# Patient Record
Sex: FEMALE | Race: WHITE | NOT HISPANIC OR LATINO
[De-identification: names, ages, dates, MRNs, and addresses within clinical notes are randomized per-mention and may not be internally consistent; named-entity substitution may affect disease eponyms.]

---

## 2017-06-22 ENCOUNTER — APPOINTMENT (OUTPATIENT)
Dept: CT IMAGING | Facility: CLINIC | Age: 77
End: 2017-06-22

## 2017-06-22 ENCOUNTER — OUTPATIENT (OUTPATIENT)
Dept: OUTPATIENT SERVICES | Facility: HOSPITAL | Age: 77
LOS: 1 days | End: 2017-06-22
Payer: MEDICARE

## 2017-06-22 DIAGNOSIS — Z90.49 ACQUIRED ABSENCE OF OTHER SPECIFIED PARTS OF DIGESTIVE TRACT: Chronic | ICD-10-CM

## 2017-06-22 DIAGNOSIS — Z00.8 ENCOUNTER FOR OTHER GENERAL EXAMINATION: ICD-10-CM

## 2017-06-22 DIAGNOSIS — Z98.89 OTHER SPECIFIED POSTPROCEDURAL STATES: Chronic | ICD-10-CM

## 2017-06-22 PROCEDURE — 71250 CT THORAX DX C-: CPT

## 2017-06-28 ENCOUNTER — RESULT REVIEW (OUTPATIENT)
Age: 77
End: 2017-06-28

## 2017-06-28 ENCOUNTER — INPATIENT (INPATIENT)
Facility: HOSPITAL | Age: 77
LOS: 4 days | Discharge: HOME | DRG: 167 | End: 2017-07-03
Attending: HOSPITALIST
Payer: MEDICARE

## 2017-06-28 VITALS
TEMPERATURE: 98 F | HEART RATE: 70 BPM | SYSTOLIC BLOOD PRESSURE: 105 MMHG | WEIGHT: 129.41 LBS | HEIGHT: 61 IN | OXYGEN SATURATION: 100 % | RESPIRATION RATE: 22 BRPM | DIASTOLIC BLOOD PRESSURE: 56 MMHG

## 2017-06-28 DIAGNOSIS — I95.89 OTHER HYPOTENSION: ICD-10-CM

## 2017-06-28 DIAGNOSIS — Z98.89 OTHER SPECIFIED POSTPROCEDURAL STATES: Chronic | ICD-10-CM

## 2017-06-28 DIAGNOSIS — J18.9 PNEUMONIA, UNSPECIFIED ORGANISM: ICD-10-CM

## 2017-06-28 DIAGNOSIS — Z90.49 ACQUIRED ABSENCE OF OTHER SPECIFIED PARTS OF DIGESTIVE TRACT: Chronic | ICD-10-CM

## 2017-06-28 DIAGNOSIS — R09.02 HYPOXEMIA: ICD-10-CM

## 2017-06-28 LAB
ALBUMIN SERPL ELPH-MCNC: 2.9 G/DL — LOW (ref 3.3–5)
ALP SERPL-CCNC: 78 U/L — SIGNIFICANT CHANGE UP (ref 40–120)
ALT FLD-CCNC: 23 U/L RC — SIGNIFICANT CHANGE UP (ref 10–45)
ANION GAP SERPL CALC-SCNC: 13 MMOL/L — SIGNIFICANT CHANGE UP (ref 5–17)
AST SERPL-CCNC: 21 U/L — SIGNIFICANT CHANGE UP (ref 10–40)
B PERT IGG+IGM PNL SER: ABNORMAL
BASOPHILS # BLD AUTO: 0 K/UL — SIGNIFICANT CHANGE UP (ref 0–0.2)
BILIRUB SERPL-MCNC: 0.3 MG/DL — SIGNIFICANT CHANGE UP (ref 0.2–1.2)
BUN SERPL-MCNC: 25 MG/DL — HIGH (ref 7–23)
CALCIUM SERPL-MCNC: 8.3 MG/DL — LOW (ref 8.4–10.5)
CHLORIDE SERPL-SCNC: 107 MMOL/L — SIGNIFICANT CHANGE UP (ref 96–108)
CK MB BLD-MCNC: 4 % — HIGH (ref 0–3.5)
CK MB CFR SERPL CALC: 1.4 NG/ML — SIGNIFICANT CHANGE UP (ref 0–3.8)
CK SERPL-CCNC: 35 U/L — SIGNIFICANT CHANGE UP (ref 25–170)
CO2 SERPL-SCNC: 19 MMOL/L — LOW (ref 22–31)
COLOR FLD: SIGNIFICANT CHANGE UP
CREAT SERPL-MCNC: 0.69 MG/DL — SIGNIFICANT CHANGE UP (ref 0.5–1.3)
EOSINOPHIL # BLD AUTO: 0.1 K/UL — SIGNIFICANT CHANGE UP (ref 0–0.5)
EOSINOPHIL # FLD: 6 % — SIGNIFICANT CHANGE UP
EOSINOPHIL NFR BLD AUTO: 1 % — SIGNIFICANT CHANGE UP (ref 0–6)
FLUID INTAKE SUBSTANCE CLASS: SIGNIFICANT CHANGE UP
FLUID SEGMENTED GRANULOCYTES: 59 % — SIGNIFICANT CHANGE UP
GAS PNL BLDA: SIGNIFICANT CHANGE UP
GLUCOSE SERPL-MCNC: 134 MG/DL — HIGH (ref 70–99)
GRAM STN FLD: SIGNIFICANT CHANGE UP
HCT VFR BLD CALC: 33.5 % — LOW (ref 34.5–45)
HGB BLD-MCNC: 11.3 G/DL — LOW (ref 11.5–15.5)
LYMPHOCYTES # BLD AUTO: 1.6 K/UL — SIGNIFICANT CHANGE UP (ref 1–3.3)
LYMPHOCYTES # BLD AUTO: 9 % — LOW (ref 13–44)
LYMPHOCYTES # FLD: 7 % — SIGNIFICANT CHANGE UP
MAGNESIUM SERPL-MCNC: 2 MG/DL — SIGNIFICANT CHANGE UP (ref 1.6–2.6)
MCHC RBC-ENTMCNC: 30.6 PG — SIGNIFICANT CHANGE UP (ref 27–34)
MCHC RBC-ENTMCNC: 33.7 GM/DL — SIGNIFICANT CHANGE UP (ref 32–36)
MCV RBC AUTO: 90.7 FL — SIGNIFICANT CHANGE UP (ref 80–100)
MESOTHL CELL # FLD: 2 % — SIGNIFICANT CHANGE UP
MONOCYTES # BLD AUTO: 0.3 K/UL — SIGNIFICANT CHANGE UP (ref 0–0.9)
MONOCYTES NFR BLD AUTO: 1 % — LOW (ref 2–14)
MONOS+MACROS # FLD: 26 % — SIGNIFICANT CHANGE UP
NEUTROPHILS # BLD AUTO: 20.3 K/UL — HIGH (ref 1.8–7.4)
NEUTROPHILS NFR BLD AUTO: 88 % — HIGH (ref 43–77)
NEUTS BAND # BLD: 1 % — SIGNIFICANT CHANGE UP (ref 0–8)
NIGHT BLUE STAIN TISS: SIGNIFICANT CHANGE UP
PHOSPHATE SERPL-MCNC: 2.9 MG/DL — SIGNIFICANT CHANGE UP (ref 2.5–4.5)
PLAT MORPH BLD: NORMAL — SIGNIFICANT CHANGE UP
PLATELET # BLD AUTO: 467 K/UL — HIGH (ref 150–400)
POTASSIUM SERPL-MCNC: 4.4 MMOL/L — SIGNIFICANT CHANGE UP (ref 3.5–5.3)
POTASSIUM SERPL-SCNC: 4.4 MMOL/L — SIGNIFICANT CHANGE UP (ref 3.5–5.3)
PROT SERPL-MCNC: 6.4 G/DL — SIGNIFICANT CHANGE UP (ref 6–8.3)
RBC # BLD: 3.7 M/UL — LOW (ref 3.8–5.2)
RBC # FLD: 13 % — SIGNIFICANT CHANGE UP (ref 10.3–14.5)
RBC BLD AUTO: SIGNIFICANT CHANGE UP
RCV VOL RI: HIGH /UL (ref 0–5)
SODIUM SERPL-SCNC: 139 MMOL/L — SIGNIFICANT CHANGE UP (ref 135–145)
SPECIMEN SOURCE: SIGNIFICANT CHANGE UP
SPECIMEN SOURCE: SIGNIFICANT CHANGE UP
TOTAL NUCLEATED CELL COUNT, BODY FLUID: 380 /UL — HIGH (ref 0–5)
TROPONIN T SERPL-MCNC: <0.01 NG/ML — SIGNIFICANT CHANGE UP (ref 0–0.06)
TUBE TYPE: SIGNIFICANT CHANGE UP
WBC # BLD: 22.4 K/UL — HIGH (ref 3.8–10.5)
WBC # FLD AUTO: 22.4 K/UL — HIGH (ref 3.8–10.5)

## 2017-06-28 PROCEDURE — 88305 TISSUE EXAM BY PATHOLOGIST: CPT | Mod: 26

## 2017-06-28 PROCEDURE — 88312 SPECIAL STAINS GROUP 1: CPT | Mod: 26

## 2017-06-28 PROCEDURE — 93010 ELECTROCARDIOGRAM REPORT: CPT

## 2017-06-28 PROCEDURE — 88188 FLOWCYTOMETRY/READ 9-15: CPT

## 2017-06-28 PROCEDURE — 71010: CPT | Mod: 26

## 2017-06-28 PROCEDURE — 71010: CPT | Mod: 26,77

## 2017-06-28 PROCEDURE — 88112 CYTOPATH CELL ENHANCE TECH: CPT | Mod: 26

## 2017-06-28 PROCEDURE — 99291 CRITICAL CARE FIRST HOUR: CPT

## 2017-06-28 RX ORDER — ATORVASTATIN CALCIUM 80 MG/1
1 TABLET, FILM COATED ORAL
Qty: 0 | Refills: 0 | COMMUNITY

## 2017-06-28 RX ORDER — VANCOMYCIN HCL 1 G
1000 VIAL (EA) INTRAVENOUS EVERY 12 HOURS
Qty: 0 | Refills: 0 | Status: DISCONTINUED | OUTPATIENT
Start: 2017-06-28 | End: 2017-06-28

## 2017-06-28 RX ORDER — FUROSEMIDE 40 MG
20 TABLET ORAL ONCE
Qty: 0 | Refills: 0 | Status: COMPLETED | OUTPATIENT
Start: 2017-06-28 | End: 2017-06-28

## 2017-06-28 RX ORDER — GABAPENTIN 400 MG/1
300 CAPSULE ORAL THREE TIMES A DAY
Qty: 0 | Refills: 0 | Status: DISCONTINUED | OUTPATIENT
Start: 2017-06-28 | End: 2017-07-03

## 2017-06-28 RX ORDER — GABAPENTIN 400 MG/1
1 CAPSULE ORAL
Qty: 0 | Refills: 0 | COMMUNITY

## 2017-06-28 RX ORDER — ASPIRIN/CALCIUM CARB/MAGNESIUM 324 MG
1 TABLET ORAL
Qty: 0 | Refills: 0 | COMMUNITY

## 2017-06-28 RX ORDER — HEPARIN SODIUM 5000 [USP'U]/ML
5000 INJECTION INTRAVENOUS; SUBCUTANEOUS EVERY 8 HOURS
Qty: 0 | Refills: 0 | Status: DISCONTINUED | OUTPATIENT
Start: 2017-06-28 | End: 2017-07-03

## 2017-06-28 RX ORDER — ASPIRIN/CALCIUM CARB/MAGNESIUM 324 MG
81 TABLET ORAL DAILY
Qty: 0 | Refills: 0 | Status: DISCONTINUED | OUTPATIENT
Start: 2017-06-28 | End: 2017-07-03

## 2017-06-28 RX ORDER — VANCOMYCIN HCL 1 G
1000 VIAL (EA) INTRAVENOUS EVERY 12 HOURS
Qty: 0 | Refills: 0 | Status: DISCONTINUED | OUTPATIENT
Start: 2017-06-28 | End: 2017-07-02

## 2017-06-28 RX ORDER — HEPARIN SODIUM 5000 [USP'U]/ML
5000 INJECTION INTRAVENOUS; SUBCUTANEOUS EVERY 8 HOURS
Qty: 0 | Refills: 0 | Status: DISCONTINUED | OUTPATIENT
Start: 2017-06-28 | End: 2017-06-28

## 2017-06-28 RX ORDER — PIPERACILLIN AND TAZOBACTAM 4; .5 G/20ML; G/20ML
3.38 INJECTION, POWDER, LYOPHILIZED, FOR SOLUTION INTRAVENOUS EVERY 8 HOURS
Qty: 0 | Refills: 0 | Status: DISCONTINUED | OUTPATIENT
Start: 2017-06-28 | End: 2017-06-28

## 2017-06-28 RX ORDER — LINEZOLID 600 MG/300ML
INJECTION, SOLUTION INTRAVENOUS
Qty: 0 | Refills: 0 | Status: DISCONTINUED | OUTPATIENT
Start: 2017-06-28 | End: 2017-06-28

## 2017-06-28 RX ORDER — PHENYLEPHRINE HYDROCHLORIDE 10 MG/ML
0.4 INJECTION INTRAVENOUS
Qty: 80 | Refills: 0 | Status: DISCONTINUED | OUTPATIENT
Start: 2017-06-28 | End: 2017-06-29

## 2017-06-28 RX ORDER — ATORVASTATIN CALCIUM 80 MG/1
40 TABLET, FILM COATED ORAL AT BEDTIME
Qty: 0 | Refills: 0 | Status: DISCONTINUED | OUTPATIENT
Start: 2017-06-28 | End: 2017-07-03

## 2017-06-28 RX ORDER — PIPERACILLIN AND TAZOBACTAM 4; .5 G/20ML; G/20ML
3.38 INJECTION, POWDER, LYOPHILIZED, FOR SOLUTION INTRAVENOUS ONCE
Qty: 0 | Refills: 0 | Status: COMPLETED | OUTPATIENT
Start: 2017-06-28 | End: 2017-06-28

## 2017-06-28 RX ORDER — PIPERACILLIN AND TAZOBACTAM 4; .5 G/20ML; G/20ML
3.38 INJECTION, POWDER, LYOPHILIZED, FOR SOLUTION INTRAVENOUS EVERY 8 HOURS
Qty: 0 | Refills: 0 | Status: DISCONTINUED | OUTPATIENT
Start: 2017-06-28 | End: 2017-07-03

## 2017-06-28 RX ADMIN — Medication 81 MILLIGRAM(S): at 14:25

## 2017-06-28 RX ADMIN — PIPERACILLIN AND TAZOBACTAM 25 GRAM(S): 4; .5 INJECTION, POWDER, LYOPHILIZED, FOR SOLUTION INTRAVENOUS at 21:38

## 2017-06-28 RX ADMIN — HEPARIN SODIUM 5000 UNIT(S): 5000 INJECTION INTRAVENOUS; SUBCUTANEOUS at 14:24

## 2017-06-28 RX ADMIN — Medication 20 MILLIGRAM(S): at 14:25

## 2017-06-28 RX ADMIN — Medication 250 MILLIGRAM(S): at 18:03

## 2017-06-28 RX ADMIN — HEPARIN SODIUM 5000 UNIT(S): 5000 INJECTION INTRAVENOUS; SUBCUTANEOUS at 21:37

## 2017-06-28 RX ADMIN — GABAPENTIN 300 MILLIGRAM(S): 400 CAPSULE ORAL at 21:37

## 2017-06-28 RX ADMIN — PIPERACILLIN AND TAZOBACTAM 200 GRAM(S): 4; .5 INJECTION, POWDER, LYOPHILIZED, FOR SOLUTION INTRAVENOUS at 14:25

## 2017-06-28 RX ADMIN — ATORVASTATIN CALCIUM 40 MILLIGRAM(S): 80 TABLET, FILM COATED ORAL at 21:37

## 2017-06-28 RX ADMIN — PHENYLEPHRINE HYDROCHLORIDE 8.8 MICROGRAM(S)/KG/MIN: 10 INJECTION INTRAVENOUS at 13:30

## 2017-06-28 NOTE — H&P ADULT - ATTENDING COMMENTS
Admitted to ICU for hypotension during and after bronchoscopy with biopsy - titrating clif off; check cardiac enzymes; obtain baseline ekg (twi on ekg currently)    Hypoxemia with exertion - suspect chronic eos pna given moving pattern of infiltrates, persistent dry cough despite macrolide and fluoroquinolone. GPC pairs on bronch as well as neut predominance and 7% eos (difficult to interpret in the setting of > 200K rbcs). No pus on bronch and afebrile making an active infection unlikely. --> would add on cd4:cd8 if not already done, pending cytology, silver stain. OK to cont vanco, zosyn empirically  (linezolid preferred but contraindx given the neosynephrine)

## 2017-06-28 NOTE — H&P ADULT - ASSESSMENT
76 yr old female with stated hx significant for recent pmn s/p antibx therapy without improvement of sx of sob and cough with persistant bilat infiltrates on cxr and air bronchograms in both lungs who presents to ICU  with hypotension and hypoxia s/p planed bronchogram requiring vasopressor therapy and NRB supplementation

## 2017-06-28 NOTE — H&P ADULT - PROBLEM SELECTOR PLAN 2
pan culture  cxr post bronchoscopy  zosyn 3.375q 8 h  vanco 1 gr IV q  f/u bronch results and cultures

## 2017-06-28 NOTE — H&P ADULT - HISTORY OF PRESENT ILLNESS
76 yr old female with PMHx CABG 2010, CAD, GERD, hereditary factor XI deficiency, R cataract surg 2016, RUE lipoma excision c/b right ulnar nerve damage 2016, recent treatment for pmn initially with zithromax and then levofloxacin in beginning of  june 2017 without improvement of symptoms of SOB and cough accompanied with inability 76 yr old female with PMHx CABG 2010, CAD, GERD, hereditary factor XI deficiency, R cataract surg 2016, RUE lipoma excision c/b right ulnar nerve damage 2016, recent treatment for multilobar pmn initially with zithromax and then levofloxacin in beginning of  june 2017 without improvement of symptoms of SOB and cough and fatique. Repeat CXR demonstrates persistent bilateral infiltrates which are peripheral in nature and wbc of 19.CT chest from 6/22 demonstrated extensive consolidations containing air bronchograms in both lungs  Pt s/p planed bronchoscopy c/b by hypotension SBP 80's and hypoxia to 80's-90's requiring received 1liter NS, clif gtt placed on NRB. during procedure pt received propofol gtt fentanyl 100 ug, benedryl 25 mg zofran 4mg.    bronch culture demonstrates rare gram positive cocci in pairs

## 2017-06-28 NOTE — H&P ADULT - PROBLEM SELECTOR PLAN 3
ECG now and q am x 3  cardiac enzymes now and q 8 hr x three  TTE to eval LVFx  supplemental O2 to maintain SPO2>/= 92%

## 2017-06-28 NOTE — PROGRESS NOTE ADULT - SUBJECTIVE AND OBJECTIVE BOX
PULMONARY PROGRESS NOTE    DUBIN, SHELLY  MRN-[id number]    Patient is a 76y old  Female who presents with a chief complaint of hypotension. She has a history of persistent pulmonary infiltrates and cough after treatment for pneumonia. She also noted SOB and cough. She was a persistent elevated WBC count. She came in for elective bronchoscopy today. Post bronchoscopy she had hypotension requiring pressors and she was brought to MICU. She also had a widened A-a gradient.     Since ICU admission she has improved. She is on pressors; 100% NRB 02 tapered to NC. Alter, oriented.     PMHX: CAD s/p CABG  Neuropathy    -  -    ACTIVE MEDICATION LIST:  MEDICATIONS  (STANDING):  heparin  Injectable 5000 Unit(s) SubCutaneous every 8 hours  aspirin  chewable 81 milliGRAM(s) Oral daily  gabapentin 300 milliGRAM(s) Oral three times a day  atorvastatin 40 milliGRAM(s) Oral at bedtime  vancomycin  IVPB 1000 milliGRAM(s) IV Intermittent every 12 hours  piperacillin/tazobactam IVPB. 3.375 Gram(s) IV Intermittent once  piperacillin/tazobactam IVPB. 3.375 Gram(s) IV Intermittent every 8 hours  furosemide   Injectable 20 milliGRAM(s) IV Push once  phenylephrine    Infusion 0.4 MICROgram(s)/kG/Min (8.805 mL/Hr) IV Continuous <Continuous>      EXAM:  Vital Signs Last 24 Hrs  T(C): 36.6 (28 Jun 2017 11:45), Max: 36.6 (28 Jun 2017 11:45)  T(F): 97.9 (28 Jun 2017 11:45), Max: 97.9 (28 Jun 2017 11:45)  HR: 91 (28 Jun 2017 13:00) (70 - 91)  BP: 113/56 (28 Jun 2017 13:00) (105/56 - 141/60)  BP(mean): 80 (28 Jun 2017 13:00) (77 - 91)  RR: 39 (28 Jun 2017 13:00) (22 - 39)  SpO2: 96% (28 Jun 2017 13:00) (96% - 100%)    GENERAL: The patient is awake and alert in no apparent distress.     SKIN: Warm, dry, no rashes    LUNGS: Clear to auscultation without wheezing, rales or rhonchi; respirations unlabored    HEART: Regular rate and rhythm without murmur.    ABDOMEN: +BS, Soft, Nontender    EXTREMITIES: No clubbing, cyanosis, edema      Labs:                        11.3   22.4  )-----------( 467      ( 28 Jun 2017 12:46 )             33.5       06-28    139  |  107  |  25<H>  ----------------------------<  134<H>  4.4   |  19<L>  |  0.69    Ca    8.3<L>      28 Jun 2017 12:46  Phos  2.9     06-28  Mg     2.0     06-28    TPro  6.4  /  Alb  2.9<L>  /  TBili  0.3  /  DBili  x   /  AST  21  /  ALT  23  /  AlkPhos  78  06-28      LIVER FUNCTIONS - ( 28 Jun 2017 12:46 )  Alb: 2.9 g/dL / Pro: 6.4 g/dL / ALK PHOS: 78 U/L / ALT: 23 U/L RC / AST: 21 U/L / GGT: x           CXR: Bilateral pulmonary infiltrates.    Cell Count, Body Fluid (06.28.17 @ 10:59)    BF Lymphocytes: 7 %    Body Fluid Appearance: Cloudy    Fluid Type: Other    Monocyte/Macrophage Count - Body Fluid: 26 %    Eosinophil Count - Body Fluid: 6 %    Fluid Segmented Granulocytes: 59: Differential based on 100 cells counted after cytocentrifugation. %    Mesothelial Cells - Body Fluid: 2 %    Tube Type: Lavender    Color - Body Fluid: Pink    Total Nucleated Cell Count, Body Fluid: 380: Includes WBC and other nucleated cells if present. /uL    Total RBC Count: 05521 /uL    Impression: Postop hypotension likely related to anesthesia. Cannot rule out early sepsis if underlying pulmonary pathology is infection. However, clinical impression pre-bronch was that pulmonary infiltrates were non-infectious; possible eosinophillic pneumonia. Note only 7% eosinophillia on BAL cell count is not enough to make this diagnosis. Would not recommend empiric steroid tx unless clinical picture worsens.    Plan: ICU observation  Taper pressors if possible  Blood cultures and empiric antibiotic therapy  Taper off 02 of possible  Await pathology.     Case discussed with ICU team and family    Micah Munson MD  [phone]

## 2017-06-28 NOTE — H&P ADULT - PROBLEM SELECTOR PLAN 1
supplemental O2 to maintain SPO2 >/= 92%  bronchodilators q 6 hr prn  HOB>/= 30 degree angle  lasix 20 mg x 1  ABG now and prn

## 2017-06-29 LAB
ALBUMIN SERPL ELPH-MCNC: 4.1 G/DL — SIGNIFICANT CHANGE UP (ref 3.3–5)
ALP SERPL-CCNC: 96 U/L — SIGNIFICANT CHANGE UP (ref 40–120)
ALT FLD-CCNC: 19 U/L RC — SIGNIFICANT CHANGE UP (ref 10–45)
ANION GAP SERPL CALC-SCNC: 14 MMOL/L — SIGNIFICANT CHANGE UP (ref 5–17)
APTT BLD: 35.7 SEC — SIGNIFICANT CHANGE UP (ref 27.5–37.4)
AST SERPL-CCNC: 20 U/L — SIGNIFICANT CHANGE UP (ref 10–40)
BASOPHILS # BLD AUTO: 0.1 K/UL — SIGNIFICANT CHANGE UP (ref 0–0.2)
BASOPHILS NFR BLD AUTO: 0.3 % — SIGNIFICANT CHANGE UP (ref 0–2)
BILIRUB SERPL-MCNC: 0.8 MG/DL — SIGNIFICANT CHANGE UP (ref 0.2–1.2)
BUN SERPL-MCNC: 10 MG/DL — SIGNIFICANT CHANGE UP (ref 7–23)
CALCIUM SERPL-MCNC: 9.8 MG/DL — SIGNIFICANT CHANGE UP (ref 8.4–10.5)
CHLORIDE SERPL-SCNC: 99 MMOL/L — SIGNIFICANT CHANGE UP (ref 96–108)
CK MB BLD-MCNC: 5.6 % — HIGH (ref 0–3.5)
CK MB CFR SERPL CALC: 1.5 NG/ML — SIGNIFICANT CHANGE UP (ref 0–3.8)
CK SERPL-CCNC: 27 U/L — SIGNIFICANT CHANGE UP (ref 25–170)
CO2 SERPL-SCNC: 27 MMOL/L — SIGNIFICANT CHANGE UP (ref 22–31)
CREAT SERPL-MCNC: 0.91 MG/DL — SIGNIFICANT CHANGE UP (ref 0.5–1.3)
EOSINOPHIL # BLD AUTO: 0 K/UL — SIGNIFICANT CHANGE UP (ref 0–0.5)
EOSINOPHIL NFR BLD AUTO: 0.2 % — SIGNIFICANT CHANGE UP (ref 0–6)
GLUCOSE SERPL-MCNC: 132 MG/DL — HIGH (ref 70–99)
HCT VFR BLD CALC: 34.5 % — SIGNIFICANT CHANGE UP (ref 34.5–45)
HGB BLD-MCNC: 11 G/DL — LOW (ref 11.5–15.5)
INR BLD: 1.5 RATIO — HIGH (ref 0.88–1.16)
LACTATE BLDV-MCNC: 1.8 MMOL/L — SIGNIFICANT CHANGE UP (ref 0.7–2)
LYMPHOCYTES # BLD AUTO: 11.7 % — LOW (ref 13–44)
LYMPHOCYTES # BLD AUTO: 2.4 K/UL — SIGNIFICANT CHANGE UP (ref 1–3.3)
MAGNESIUM SERPL-MCNC: 2.2 MG/DL — SIGNIFICANT CHANGE UP (ref 1.6–2.6)
MCHC RBC-ENTMCNC: 29.3 PG — SIGNIFICANT CHANGE UP (ref 27–34)
MCHC RBC-ENTMCNC: 31.9 GM/DL — LOW (ref 32–36)
MCV RBC AUTO: 91.6 FL — SIGNIFICANT CHANGE UP (ref 80–100)
MONOCYTES # BLD AUTO: 1.4 K/UL — HIGH (ref 0–0.9)
MONOCYTES NFR BLD AUTO: 6.8 % — SIGNIFICANT CHANGE UP (ref 2–14)
NEUTROPHILS # BLD AUTO: 16.9 K/UL — HIGH (ref 1.8–7.4)
NEUTROPHILS NFR BLD AUTO: 81 % — HIGH (ref 43–77)
PHOSPHATE SERPL-MCNC: 2.8 MG/DL — SIGNIFICANT CHANGE UP (ref 2.5–4.5)
PLATELET # BLD AUTO: 460 K/UL — HIGH (ref 150–400)
POTASSIUM SERPL-MCNC: 3.7 MMOL/L — SIGNIFICANT CHANGE UP (ref 3.5–5.3)
POTASSIUM SERPL-SCNC: 3.7 MMOL/L — SIGNIFICANT CHANGE UP (ref 3.5–5.3)
PROT SERPL-MCNC: 7.4 G/DL — SIGNIFICANT CHANGE UP (ref 6–8.3)
PROTHROM AB SERPL-ACNC: 16.5 SEC — HIGH (ref 9.8–12.7)
RBC # BLD: 3.76 M/UL — LOW (ref 3.8–5.2)
RBC # FLD: 13 % — SIGNIFICANT CHANGE UP (ref 10.3–14.5)
SODIUM SERPL-SCNC: 140 MMOL/L — SIGNIFICANT CHANGE UP (ref 135–145)
TROPONIN T SERPL-MCNC: <0.01 NG/ML — SIGNIFICANT CHANGE UP (ref 0–0.06)
WBC # BLD: 20.8 K/UL — HIGH (ref 3.8–10.5)
WBC # FLD AUTO: 20.8 K/UL — HIGH (ref 3.8–10.5)

## 2017-06-29 PROCEDURE — 99291 CRITICAL CARE FIRST HOUR: CPT

## 2017-06-29 PROCEDURE — 93010 ELECTROCARDIOGRAM REPORT: CPT

## 2017-06-29 PROCEDURE — 93306 TTE W/DOPPLER COMPLETE: CPT | Mod: 26

## 2017-06-29 RX ORDER — COSYNTROPIN 0.25 MG/ML
0.25 INJECTION, SOLUTION INTRAVENOUS ONCE
Qty: 0 | Refills: 0 | Status: COMPLETED | OUTPATIENT
Start: 2017-06-29 | End: 2017-06-29

## 2017-06-29 RX ORDER — METOPROLOL TARTRATE 50 MG
12.5 TABLET ORAL
Qty: 0 | Refills: 0 | Status: DISCONTINUED | OUTPATIENT
Start: 2017-06-29 | End: 2017-07-03

## 2017-06-29 RX ORDER — SODIUM CHLORIDE 9 MG/ML
1000 INJECTION INTRAMUSCULAR; INTRAVENOUS; SUBCUTANEOUS
Qty: 0 | Refills: 0 | Status: DISCONTINUED | OUTPATIENT
Start: 2017-06-29 | End: 2017-07-03

## 2017-06-29 RX ORDER — NOREPINEPHRINE BITARTRATE/D5W 8 MG/250ML
0.01 PLASTIC BAG, INJECTION (ML) INTRAVENOUS
Qty: 8 | Refills: 0 | Status: DISCONTINUED | OUTPATIENT
Start: 2017-06-29 | End: 2017-06-29

## 2017-06-29 RX ORDER — METOPROLOL TARTRATE 50 MG
5 TABLET ORAL ONCE
Qty: 0 | Refills: 0 | Status: COMPLETED | OUTPATIENT
Start: 2017-06-29 | End: 2017-06-29

## 2017-06-29 RX ADMIN — Medication 5 MILLIGRAM(S): at 18:50

## 2017-06-29 RX ADMIN — Medication 12.5 MILLIGRAM(S): at 19:43

## 2017-06-29 RX ADMIN — GABAPENTIN 300 MILLIGRAM(S): 400 CAPSULE ORAL at 21:47

## 2017-06-29 RX ADMIN — ATORVASTATIN CALCIUM 40 MILLIGRAM(S): 80 TABLET, FILM COATED ORAL at 21:47

## 2017-06-29 RX ADMIN — PIPERACILLIN AND TAZOBACTAM 25 GRAM(S): 4; .5 INJECTION, POWDER, LYOPHILIZED, FOR SOLUTION INTRAVENOUS at 14:11

## 2017-06-29 RX ADMIN — Medication 1 MILLIGRAM(S): at 15:45

## 2017-06-29 RX ADMIN — GABAPENTIN 300 MILLIGRAM(S): 400 CAPSULE ORAL at 05:19

## 2017-06-29 RX ADMIN — GABAPENTIN 300 MILLIGRAM(S): 400 CAPSULE ORAL at 14:12

## 2017-06-29 RX ADMIN — Medication 81 MILLIGRAM(S): at 12:44

## 2017-06-29 RX ADMIN — COSYNTROPIN 0.25 MILLIGRAM(S): 0.25 INJECTION, SOLUTION INTRAVENOUS at 14:12

## 2017-06-29 RX ADMIN — HEPARIN SODIUM 5000 UNIT(S): 5000 INJECTION INTRAVENOUS; SUBCUTANEOUS at 05:19

## 2017-06-29 RX ADMIN — PIPERACILLIN AND TAZOBACTAM 25 GRAM(S): 4; .5 INJECTION, POWDER, LYOPHILIZED, FOR SOLUTION INTRAVENOUS at 05:19

## 2017-06-29 RX ADMIN — PIPERACILLIN AND TAZOBACTAM 25 GRAM(S): 4; .5 INJECTION, POWDER, LYOPHILIZED, FOR SOLUTION INTRAVENOUS at 21:47

## 2017-06-29 RX ADMIN — HEPARIN SODIUM 5000 UNIT(S): 5000 INJECTION INTRAVENOUS; SUBCUTANEOUS at 21:47

## 2017-06-29 RX ADMIN — SODIUM CHLORIDE 75 MILLILITER(S): 9 INJECTION INTRAMUSCULAR; INTRAVENOUS; SUBCUTANEOUS at 20:08

## 2017-06-29 RX ADMIN — Medication 250 MILLIGRAM(S): at 18:51

## 2017-06-29 RX ADMIN — Medication 1.1 MICROGRAM(S)/KG/MIN: at 04:31

## 2017-06-29 RX ADMIN — HEPARIN SODIUM 5000 UNIT(S): 5000 INJECTION INTRAVENOUS; SUBCUTANEOUS at 14:12

## 2017-06-29 RX ADMIN — Medication 250 MILLIGRAM(S): at 05:20

## 2017-06-29 NOTE — CONSULT NOTE ADULT - SUBJECTIVE AND OBJECTIVE BOX
CHIEF COMPLAINT/REASON FOR CONSULT:  CP, ST Depressions, Hemodynamic Instability following Terbutaline    HISTORY OF PRESENT ILLNESS:  76F - h.o. GERD, FXI deficiency, CAD s/p CABG 2010 - initially presented with SOB requiring ICU care for oxygenation/ventilation - cardiology c/f immediate tachycardia, CP, STDs, hemodynamic .  Patient was giving Terbutaline and developed immediate tachycardia, tremulousness, and <1mm lateral STDs. she had come off pressor support earlier in tthe day.   Patient reports a normal stress in 2016.      Allergies    codeine (Nausea)    Intolerances    	    MEDICATIONS:  aspirin  chewable 81 milliGRAM(s) Oral daily  heparin  Injectable 5000 Unit(s) SubCutaneous every 8 hours  norepinephrine Infusion 0.01 MICROgram(s)/kG/Min IV Continuous <Continuous>  metoprolol Injectable 5 milliGRAM(s) IV Push once    piperacillin/tazobactam IVPB. 3.375 Gram(s) IV Intermittent every 8 hours  vancomycin  IVPB 1000 milliGRAM(s) IV Intermittent every 12 hours      gabapentin 300 milliGRAM(s) Oral three times a day      atorvastatin 40 milliGRAM(s) Oral at bedtime        PAST MEDICAL & SURGICAL HISTORY:  GERD (gastroesophageal reflux disease)  Chest pain in adult: BYPASS SURGERY  History of appendectomy  History of cataract surgery, right      FAMILY HISTORY:      SOCIAL HISTORY:    [x ] Non-smoker  [ ] Smoker  [ ] No Alcohol      REVIEW OF SYSTEMS:  Gen: Anxious; fatigued  Neuro: No HAs/N/V  Endocrine: +Sweating, no pruritis  Skin: Swelling at site of injection; no erythema   Resp: Improved SOB; + Cough  CV: + CP +Palpitations  Abd: No N/V/no Abd Pain  Infectious: No recent travel; no sick contacts  Heme: +h.o. FXI deficiency; no prupura  MS: no joint pain; no body aches    PHYSICAL EXAM:  T(C): 37 (06-29-17 @ 16:00), Max: 37.1 (06-29-17 @ 04:00)  HR: 123 (06-29-17 @ 16:15) (54 - 123)  BP: 115/55 (06-29-17 @ 16:15) (74/40 - 158/69)  RR: 34 (06-29-17 @ 16:15) (21 - 51)  SpO2: 99% (06-29-17 @ 16:15) (86% - 100%)  Wt(kg): --  I&O's Summary    28 Jun 2017 07:01  -  29 Jun 2017 07:00  --------------------------------------------------------  IN: 784.5 mL / OUT: 0 mL / NET: 784.5 mL        Appearance: +Anxious + Trmemulousness  HEENT:   Normal oral mucosa, PERRL, EOMI	  Lymphatic: No lymphadenopathy  Cardiovascular: Normal S1 S2, +Tachycardia No JVD, No murmurs, No edema  Respiratory: Coarse BS	  Psychiatry: A & O x 3, Mood & affect appropriate, but anxious likely 2/2 to meds   Gastrointestinal:  Soft, Non-tender, + BS	  Skin: No rashes, No ecchymoses, No cyanosis	  Neurologic: Non-focal  Extremities: Normal range of motion, No clubbing, cyanosis or edema  Vascular: Peripheral pulses palpable 2+ bilaterally        LABS:	 	    CBC Full  -  ( 29 Jun 2017 03:35 )  WBC Count : 20.8 K/uL  Hemoglobin : 11.0 g/dL  Hematocrit : 34.5 %  Platelet Count - Automated : 460 K/uL  Mean Cell Volume : 91.6 fl  Mean Cell Hemoglobin : 29.3 pg  Mean Cell Hemoglobin Concentration : 31.9 gm/dL  Auto Neutrophil # : 16.9 K/uL  Auto Lymphocyte # : 2.4 K/uL  Auto Monocyte # : 1.4 K/uL  Auto Eosinophil # : 0.0 K/uL  Auto Basophil # : 0.1 K/uL  Auto Neutrophil % : 81.0 %  Auto Lymphocyte % : 11.7 %  Auto Monocyte % : 6.8 %  Auto Eosinophil % : 0.2 %  Auto Basophil % : 0.3 %    06-29    140  |  99  |  10  ----------------------------<  132<H>  3.7   |  27  |  0.91  06-28    139  |  107  |  25<H>  ----------------------------<  134<H>  4.4   |  19<L>  |  0.69    Ca    9.8      29 Jun 2017 11:18  Ca    8.3<L>      28 Jun 2017 12:46  Phos  2.8     06-29  Phos  2.9     06-28  Mg     2.2     06-29  Mg     2.0     06-28    TPro  7.4  /  Alb  4.1  /  TBili  0.8  /  DBili  x   /  AST  20  /  ALT  19  /  AlkPhos  96  06-29  TPro  6.4  /  Alb  2.9<L>  /  TBili  0.3  /  DBili  x   /  AST  21  /  ALT  23  /  AlkPhos  78  06-28            CARDIAC MARKERS:            TELEMETRY: 	Sinus tachycardia    ECG:  	Sinus tachycardia with resolving maximum amplitude < 1mm STDs  RADIOLOGY:  < from: Xray Chest 1 View AP- PORTABLE-Urgent (06.28.17 @ 16:28) >  Impression:    The heart is normal in size. Mild elevation right hemidiaphragm. Diffuse   airspace disease is present compatible with multifocal pneumonia and   superimposed pulmonary edema. This appears to be improving when compared   to previous study done June 28, 2017 at 10:42 AM. Status post sternotomy.                    SADIA MAO M.D., ATTENDING RADIOLOGIST  This document has been electronically signed. Jun 29 2017  8:39AM              < end of copied text >    Prelim Echo: Demonstrates hyperdynamic ventricle and collapsing IVC; no WMAs

## 2017-06-29 NOTE — DIETITIAN INITIAL EVALUATION ADULT. - ENERGY NEEDS
Ht: 61"  Wt: 129.4  BMI: 24.5 kg/m2   IBW: 105 (+/-10%)    123% IBW  Edema: none   Skin: no pressure injuries

## 2017-06-29 NOTE — PROGRESS NOTE ADULT - SUBJECTIVE AND OBJECTIVE BOX
PULMONARY PROGRESS NOTE    DUBIN, SHELLY  MRN-[id number]     -Patient is a 76y old  Female who presents with a chief complaint of hypotension. She has a history of persistent pulmonary infiltrates and cough after treatment for pneumonia. She also noted SOB and cough. She was a persistent elevated WBC count. She came in for elective bronchoscopy today. Post bronchoscopy she had hypotension requiring pressors and she was brought to MICU. She also had a widened A-a gradient.     Since ICU admission she has improved. She is on pressors; 100% NRB 02 tapered to NC. Alert, oriented. Still on minimal pressors.    -    ACTIVE MEDICATION LIST:  MEDICATIONS  (STANDING):  aspirin  chewable 81 milliGRAM(s) Oral daily  gabapentin 300 milliGRAM(s) Oral three times a day  atorvastatin 40 milliGRAM(s) Oral at bedtime  piperacillin/tazobactam IVPB. 3.375 Gram(s) IV Intermittent every 8 hours  vancomycin  IVPB 1000 milliGRAM(s) IV Intermittent every 12 hours  heparin  Injectable 5000 Unit(s) SubCutaneous every 8 hours  norepinephrine Infusion 0.01 MICROgram(s)/kG/Min (1.101 mL/Hr) IV Continuous <Continuous>    MEDICATIONS  (PRN):      EXAM:  Vital Signs Last 24 Hrs  T(C): 37.1 (29 Jun 2017 04:00), Max: 37.1 (29 Jun 2017 04:00)  T(F): 98.8 (29 Jun 2017 04:00), Max: 98.8 (29 Jun 2017 04:00)  HR: 74 (29 Jun 2017 07:00) (54 - 93)  BP: 93/54 (29 Jun 2017 07:00) (82/46 - 158/69)  BP(mean): 69 (29 Jun 2017 07:00) (60 - 99)  RR: 21 (29 Jun 2017 07:00) (21 - 51)  SpO2: 98% (29 Jun 2017 07:00) (86% - 100%)    GENERAL: The patient is awake and alert in no apparent distress.     SKIN: Warm, dry, no rashes    LUNGS: Clear to auscultation without wheezing, rales or rhonchi; respirations unlabored    HEART: Regular rate and rhythm without murmur.    ABDOMEN: +BS, Soft, Nontender    EXTREMITIES: No clubbing, cyanosis, edema    ABG - ( 28 Jun 2017 17:05 )  pH: 7.44  /  pCO2: 34    /  pO2: 106   / HCO3: 23    / Base Excess: -.1   /  SaO2: 98                                        11.0   20.8  )-----------( 460      ( 29 Jun 2017 03:35 )             34.5       06-28    139  |  107  |  25<H>  ----------------------------<  134<H>  4.4   |  19<L>  |  0.69    Ca    8.3<L>      28 Jun 2017 12:46  Phos  2.9     06-28  Mg     2.0     06-28    TPro  6.4  /  Alb  2.9<L>  /  TBili  0.3  /  DBili  x   /  AST  21  /  ALT  23  /  AlkPhos  78  06-28      LIVER FUNCTIONS - ( 28 Jun 2017 12:46 )  Alb: 2.9 g/dL / Pro: 6.4 g/dL / ALK PHOS: 78 U/L / ALT: 23 U/L RC / AST: 21 U/L / GGT: x               Blood Gas Arterial, Lactate: 2.3 (06.28.17 @ 17:05)      Cell Count, Body Fluid (06.28.17 @ 10:59)    BF Lymphocytes: 7 %    Body Fluid Appearance: Cloudy    Fluid Type: Other    Monocyte/Macrophage Count - Body Fluid: 26 %    Eosinophil Count - Body Fluid: 6 %    Fluid Segmented Granulocytes: 59: Differential based on 100 cells counted after cytocentrifugation. %    Mesothelial Cells - Body Fluid: 2 %    Tube Type: Lavender    Color - Body Fluid: Pink    Total Nucleated Cell Count, Body Fluid: 380: Includes WBC and other nucleated cells if present. /uL    Total RBC Count: 17865 /uL      BAL Gram Stain:   Numerous Red blood cells per low power field  Few polymorphonuclear leukocytes per low power field  Rare Gram positive cocci in pairs per oil power field (06.28.17 @ 12:58)    BAL Culture - Acid Fast - Bronchial w/Smear (06.28.17 @ 12:58)    Specimen Source: .Broncial Bronchoalveolar Lavage    Acid Fast Bacilli Smear:   No acid fast bacilli seen by fluorochrome stain        PROBLEM LIST:  76y Female with HEALTH ISSUES - PROBLEM Dx:    Pulmonary infiltrates  Postop hypotension      RECS:    Taper off Levophed if possible  Continue antibiotics for now  trend serum lactate  await path result  would try to hold off steroids pending pathology and potential need for VATS if bronch negative; considering postoperative course need to evaluate risk/benefit of surgery.  Consider AM cortisol/cotrosyn stim test.         Micah Munson MD  [phone]

## 2017-06-29 NOTE — CONSULT NOTE ADULT - ASSESSMENT
76F - h.o. GERD, FXI deficiency, CAD s/p CABG 2010 - initially presented with SOB requiring ICU care for oxygenation/ventilation - cardiology c/f immediate tachycardia, CP, STDs, hemodynamic instability that resolved.  Although differential in most circumstances includes PE; in this case, the fact that the symptoms, examiniation findings, and diagnostic testing all changed immediately after terbutaline - would attribute to latter and only pursue workup for other causes if status changes.    Problems:  1. CP  2. STDs  3. Hemodynamic Instability  4. Tachycardia  5. H/O CAD    Recs  - Start Metoprolol Tartate 25 BID  - Would give IVF as prelim TTE with hyperdynamic findings (follow up on formal report)  - trend enzymes (but please call me [phone] if questions)  - Would continue ASA/Atorva  - Check TSH  - Workup other causes only if suspicion remains    Thank you for this consult    Domenico Aj    Please call me [phone] if questions            45 minutes spent in coordination with medical ICU team. > 50% time spent on discussion of hemodynamics, interpretation, plan, arranging stat echo, and counseling/discussion with family for critically ill patient. .

## 2017-06-29 NOTE — PROGRESS NOTE ADULT - ASSESSMENT
76 yr old female with stated hx significant for recent pmn s/p antibx therapy without improvement of sx of sob and cough with persistant bilat infiltrates on cxr and air bronchograms in both lungs who presents to ICU  with hypotension and hypoxia s/p planed bronchogram requiring vasopressor therapy and NRB supplementation 77 y/o female with h/o CAD s/p CABG 2015, recent PNA 5/2017 s/p treatment with azithromycin and levofloxacin without improvement of dyspnea or cough, found to have peripheral eosinophilia and persistent infiltrates on CXR, presented initially for planned bronchoscopy for biopsy with procedure c/b hypotension and hypoxia requiring pressor support and MICU admission for monitoring, now doing well off pressors.    # Neuro: awake, alert, off sedation. No deficits    # Cardiac:   Questionable changes on admission EKG, but patient without chest pain and cardiac enzymes negative x2  Patient is s/p CABG 2 years ago, c/w ASA amd statin  Initially requiring phenylephrine for pressor support, now successfully titrated off pressors    #Respiratory:  Patient presented for planned bronch for evaluation of persistent cough, dyspnea, peripheral eosinophilia and persistent infiltrates on chest imaging  Patient hypoxic during bronchoscopy, initially requiring NRB for support, now doing well on O2NC  C/w duoneb PRN  Wean O2 NC as tolerated  Bronch showing rare GPC in pairs, 380 nucleated cells, only 7% eosinophils, less c/w eosinophilic PNA  C/w vancomycin and zosyn for now for empiric coverage  F/u BCx, bronchial cultures  F/u pulmonology recs Dr Munson, patient may need VATS procedure if diagnosis remains unclear     ID:   Patient presented for planned bronch for evaluation of persistent cough, dyspnea, peripheral eosinophilia and persistent infiltrates on chest imaging  Bronch showing rare GPC in pairs, 380 nucleated cells, only 7% eosinophils, less c/w eosinophilic PNA  C/w vancomycin and zosyn for now for empiric coverage  F/u BCx, bronchial cultures    Renal: Stable, Cr at baseline    GI: Stable, c/w DASH diet    Endocrine: no h/o diabetes, no need for HISS    FEN: on DASH diet  DVT PPX: HSQ  Dispo: full code

## 2017-06-29 NOTE — CHART NOTE - NSCHARTNOTEFT_GEN_A_CORE
77 y/o female with h/o CAD s/p CABG 2015, recent PNA 5/2017 s/p treatment with azithromycin and levofloxacin without improvement of dyspnea or cough, found to have peripheral eosinophilia and persistent infiltrates on CXR, presented initially for planned bronchoscopy for biopsy with procedure c/b hypotension and hypoxia requiring pressor support and MICU admission for monitoring. S/p IV infiltration with levophed. Levo switched to opposite upper extremity PIV and patient weaned off, HD stable. Terbutaline subsequently injected at infiltration site at 16:05 and within ten minutes, patient had facial flushing. Tachycardia to 120's, substernal chest discomfort and SOB requiring 4L O2. EKG showed sinus tachycardia with V5-V6 ST depressions, likely demand ischemia 2/2 systemic rxn to terbutaline. BP remained stable off pressors, patient with MAPs in 70-80s. Cardiology consulted and recommended lopressor 5 mg x1 and restarting patient's home dose of lopressor. Patient tolerated BB blocker well with >>100's, resolution of substernal CP.     Plan:  -Will c/w BB therapy.  -f/u EKG  -will keep PE on the differential and w/u if patient has clinically significant change, however given above episode happened after terbutaline injection, patient likely had systemic rxn to terbutaline

## 2017-06-29 NOTE — DIETITIAN INITIAL EVALUATION ADULT. - OTHER INFO
Pt seen for: length of stay.   Adm dx:  MICU adm for hypotension p bronchoscopy, pathology is pending.    GI issues: denies N/V/D/C  Last BM: 2 days ago    Food allergies: NKFA   Vit/supplement PTA:  none

## 2017-06-29 NOTE — PROGRESS NOTE ADULT - SUBJECTIVE AND OBJECTIVE BOX
CHIEF COMPLAINT: hypotension after planned bronch    Interval Events: Patient weaned off of pressors overnight, maintaining adequate BP. Still requiring O2 NC for support.     REVIEW OF SYSTEMS:  Constitutional: [ ] negative [N ] fevers [ ] chills [ ] weight loss [ ] weight gain  HEENT: [ ] negative [ ] dry eyes [ ] eye irritation [ ] postnasal drip [ ] nasal congestion  CV: [ ] negative  [N ] chest pain [N ] orthopnea [ ] palpitations [ ] murmur  Resp: [ ] negative [Y ] cough [N ] shortness of breath [ ] dyspnea [ ] wheezing [N ] sputum [ ] hemoptysis  GI: [ ] negative [ ] nausea [N ] vomiting [ N] diarrhea [ ] constipation [ ] abd pain [ ] dysphagia   : [ ] negative [ ] dysuria [ ] nocturia [ ] hematuria [ ] increased urinary frequency  Musculoskeletal: [ ] negative [N ] back pain [ ] myalgias [ ] arthralgias [ ] fracture  Skin: [ ] negative [N ] rash [ ] itch  Neurological: [ ] negative [ ] headache [ N] dizziness [N ] syncope [N ] weakness [ ] numbness  Psychiatric: [ ] negative [ ] anxiety [ ] depression  Endocrine: [ ] negative [ ] diabetes [ ] thyroid problem  Hematologic/Lymphatic: [ ] negative [ ] anemia [ ] bleeding problem  Allergic/Immunologic: [ ] negative [ ] itchy eyes [ ] nasal discharge [ ] hives [ ] angioedema  [N ] All other systems negative  [ ] Unable to assess ROS because ________    OBJECTIVE:  ICU Vital Signs Last 24 Hrs  T(C): 37.1 (29 Jun 2017 04:00), Max: 37.1 (29 Jun 2017 04:00)  T(F): 98.8 (29 Jun 2017 04:00), Max: 98.8 (29 Jun 2017 04:00)  HR: 74 (29 Jun 2017 07:00) (54 - 93)  BP: 93/54 (29 Jun 2017 07:00) (82/46 - 158/69)  BP(mean): 69 (29 Jun 2017 07:00) (60 - 99)  ABP: --  ABP(mean): --  RR: 21 (29 Jun 2017 07:00) (21 - 51)  SpO2: 98% (29 Jun 2017 07:00) (86% - 100%)      06-28 @ 07:01 - 06-29 @ 07:00  --------------------------------------------------------  IN: 784.5 mL / OUT: 0 mL / NET: 784.5 mL      CAPILLARY BLOOD GLUCOSE  134 (28 Jun 2017 12:45)      PHYSICAL EXAM:  General: NAD, comfortable in bed  HEENT: PERRL, EOMI  Lymph Nodes: no ITALO  Neck: supple, no JVD  Respiratory: CTAB/L, no wheezes, rales or ronchi, no respiratory distress  Cardiovascular: RRR, S1S2, no MRG  Abdomen: soft, NTND, +BS  Extremities: warm, no edema  Skin: warm, dry, intact  Neurological: no focal deficits  Psychiatry: AAOx3    LINES:    HOSPITAL MEDICATIONS:  aspirin  chewable 81 milliGRAM(s) Oral daily  heparin  Injectable 5000 Unit(s) SubCutaneous every 8 hours    piperacillin/tazobactam IVPB. 3.375 Gram(s) IV Intermittent every 8 hours  vancomycin  IVPB 1000 milliGRAM(s) IV Intermittent every 12 hours    norepinephrine Infusion 0.01 MICROgram(s)/kG/Min IV Continuous <Continuous>    atorvastatin 40 milliGRAM(s) Oral at bedtime      gabapentin 300 milliGRAM(s) Oral three times a day      LABS:                        11.0   20.8  )-----------( 460      ( 29 Jun 2017 03:35 )             34.5     Hgb Trend: 11.0<--, 11.3<--  06-28    139  |  107  |  25<H>  ----------------------------<  134<H>  4.4   |  19<L>  |  0.69    Ca    8.3<L>      28 Jun 2017 12:46  Phos  2.9     06-28  Mg     2.0     06-28    TPro  6.4  /  Alb  2.9<L>  /  TBili  0.3  /  DBili  x   /  AST  21  /  ALT  23  /  AlkPhos  78  06-28    Creatinine Trend: 0.69<--  PT/INR - ( 29 Jun 2017 03:35 )   PT: 16.5 sec;   INR: 1.50 ratio         PTT - ( 29 Jun 2017 03:35 )  PTT:35.7 sec    Arterial Blood Gas:  06-28 @ 17:05  7.44/34/106/23/98/-.1  ABG lactate: --    Troponin T, Serum (06.29.17 @ 03:35)    Troponin T, Serum: <0.01: Reference Interval for Troponin T  Less than 0.06 ng/mL - includes the 99th percentile of a healthy  population at a method C.V. of 10% or less.  NOTE: Troponin T is measured by the Roche ECLIA method and these  results are not interchangable with Tro<0.01: ponin I results since they are  different assays with different reference intervals. ng/mL    Troponin T, Serum (06.28.17 @ 12:46)    Troponin T, Serum: <0.01: Reference Interval for Troponin T  Less than 0.06 ng/mL - includes the 99th percentile of a healthy  population at a method C.V. of 10% or less.  NOTE: Troponin T is measured by the Roche ECLIA method and these  results are not interchangable with Tro<0.01: ponin I results since they are  different assays with different reference intervals. ng/mL    CKMB Mass Assay (06.29.17 @ 03:35)    CPK Mass Assay %: 5.6: "CKMB% results are not reliable when total CPK is < 100 U/L". %  CKMB Mass Assay (06.28.17 @ 12:46)    CPK Mass Assay %: 4.0: "CKMB% results are not reliable when total CPK is < 100 U/L". %    MICROBIOLOGY:     Cell Count, Body Fluid (06.28.17 @ 10:59)    BF Lymphocytes: 7 %    Body Fluid Appearance: Cloudy    Fluid Type: Other    Monocyte/Macrophage Count - Body Fluid: 26 %    Eosinophil Count - Body Fluid: 6 %    Fluid Segmented Granulocytes: 59: Differential based on 100 cells counted after cytocentrifugation. %    Mesothelial Cells - Body Fluid: 2 %    Tube Type: Lavender    Color - Body Fluid: Pink    Total Nucleated Cell Count, Body Fluid: 380: Includes WBC and other nucleated cells if present. /uL    Total RBC Count: 29124 /uL    Culture - Acid Fast - Bronchial w/Smear (06.28.17 @ 12:58)    Specimen Source: .Broncial Bronchoalveolar Lavage    Acid Fast Bacilli Smear:   No acid fast bacilli seen by fluorochrome stain    Culture - Bronchial (06.28.17 @ 12:58)    Gram Stain:   Numerous Red blood cells per low power field  Few polymorphonuclear leukocytes per low power field  Rare Gram positive cocci in pairs per oil power field    Specimen Source: .Broncial Bronchoalveolar Lavage    RADIOLOGY:  < from: Xray Chest 1 View AP- PORTABLE-Urgent (06.28.17 @ 10:30) >  IMPRESSION:    1.  Bilateral mid to lower lung opacities compatible with consolidations   of uncertain etiology. These are better visualized on recent CT dated   6/22/2017.   2.  No pneumothorax.    < end of copied text >    [x ] Reviewed and interpreted by me    EKG:

## 2017-06-30 DIAGNOSIS — R94.31 ABNORMAL ELECTROCARDIOGRAM [ECG] [EKG]: ICD-10-CM

## 2017-06-30 DIAGNOSIS — I95.9 HYPOTENSION, UNSPECIFIED: ICD-10-CM

## 2017-06-30 DIAGNOSIS — J18.9 PNEUMONIA, UNSPECIFIED ORGANISM: ICD-10-CM

## 2017-06-30 LAB
ALBUMIN SERPL ELPH-MCNC: 2.1 G/DL — LOW (ref 3.3–5)
ALP SERPL-CCNC: 59 U/L — SIGNIFICANT CHANGE UP (ref 40–120)
ALT FLD-CCNC: 16 U/L RC — SIGNIFICANT CHANGE UP (ref 10–45)
ANION GAP SERPL CALC-SCNC: 10 MMOL/L — SIGNIFICANT CHANGE UP (ref 5–17)
APTT BLD: 41.7 SEC — HIGH (ref 27.5–37.4)
AST SERPL-CCNC: 15 U/L — SIGNIFICANT CHANGE UP (ref 10–40)
BASOPHILS # BLD AUTO: 0 K/UL — SIGNIFICANT CHANGE UP (ref 0–0.2)
BASOPHILS NFR BLD AUTO: 0.3 % — SIGNIFICANT CHANGE UP (ref 0–2)
BILIRUB SERPL-MCNC: 0.2 MG/DL — SIGNIFICANT CHANGE UP (ref 0.2–1.2)
BUN SERPL-MCNC: 13 MG/DL — SIGNIFICANT CHANGE UP (ref 7–23)
CALCIUM SERPL-MCNC: 7.8 MG/DL — LOW (ref 8.4–10.5)
CHLORIDE SERPL-SCNC: 112 MMOL/L — HIGH (ref 96–108)
CO2 SERPL-SCNC: 20 MMOL/L — LOW (ref 22–31)
CREAT SERPL-MCNC: 0.63 MG/DL — SIGNIFICANT CHANGE UP (ref 0.5–1.3)
CULTURE RESULTS: SIGNIFICANT CHANGE UP
EOSINOPHIL # BLD AUTO: 0.2 K/UL — SIGNIFICANT CHANGE UP (ref 0–0.5)
EOSINOPHIL NFR BLD AUTO: 1.5 % — SIGNIFICANT CHANGE UP (ref 0–6)
GLUCOSE SERPL-MCNC: 101 MG/DL — HIGH (ref 70–99)
HCT VFR BLD CALC: 26 % — LOW (ref 34.5–45)
HGB BLD-MCNC: 8.7 G/DL — LOW (ref 11.5–15.5)
INR BLD: 1.51 RATIO — HIGH (ref 0.88–1.16)
LYMPHOCYTES # BLD AUTO: 17.4 % — SIGNIFICANT CHANGE UP (ref 13–44)
LYMPHOCYTES # BLD AUTO: 2.4 K/UL — SIGNIFICANT CHANGE UP (ref 1–3.3)
MAGNESIUM SERPL-MCNC: 1.9 MG/DL — SIGNIFICANT CHANGE UP (ref 1.6–2.6)
MCHC RBC-ENTMCNC: 30.4 PG — SIGNIFICANT CHANGE UP (ref 27–34)
MCHC RBC-ENTMCNC: 33.4 GM/DL — SIGNIFICANT CHANGE UP (ref 32–36)
MCV RBC AUTO: 91.1 FL — SIGNIFICANT CHANGE UP (ref 80–100)
MONOCYTES # BLD AUTO: 1 K/UL — HIGH (ref 0–0.9)
MONOCYTES NFR BLD AUTO: 7.4 % — SIGNIFICANT CHANGE UP (ref 2–14)
NEUTROPHILS # BLD AUTO: 10.1 K/UL — HIGH (ref 1.8–7.4)
NEUTROPHILS NFR BLD AUTO: 73.4 % — SIGNIFICANT CHANGE UP (ref 43–77)
PHOSPHATE SERPL-MCNC: 3.2 MG/DL — SIGNIFICANT CHANGE UP (ref 2.5–4.5)
PLATELET # BLD AUTO: 343 K/UL — SIGNIFICANT CHANGE UP (ref 150–400)
POTASSIUM SERPL-MCNC: 3.6 MMOL/L — SIGNIFICANT CHANGE UP (ref 3.5–5.3)
POTASSIUM SERPL-SCNC: 3.6 MMOL/L — SIGNIFICANT CHANGE UP (ref 3.5–5.3)
PROT SERPL-MCNC: 5.1 G/DL — LOW (ref 6–8.3)
PROTHROM AB SERPL-ACNC: 16.6 SEC — HIGH (ref 9.8–12.7)
RBC # BLD: 2.86 M/UL — LOW (ref 3.8–5.2)
RBC # FLD: 13 % — SIGNIFICANT CHANGE UP (ref 10.3–14.5)
SODIUM SERPL-SCNC: 142 MMOL/L — SIGNIFICANT CHANGE UP (ref 135–145)
SPECIMEN SOURCE: SIGNIFICANT CHANGE UP
TSH SERPL-MCNC: 0.9 UIU/ML — SIGNIFICANT CHANGE UP (ref 0.27–4.2)
VANCOMYCIN TROUGH SERPL-MCNC: 12.5 UG/ML — SIGNIFICANT CHANGE UP (ref 10–20)
WBC # BLD: 13.8 K/UL — HIGH (ref 3.8–10.5)
WBC # FLD AUTO: 13.8 K/UL — HIGH (ref 3.8–10.5)

## 2017-06-30 PROCEDURE — 99233 SBSQ HOSP IP/OBS HIGH 50: CPT | Mod: GC

## 2017-06-30 PROCEDURE — 71010: CPT | Mod: 26

## 2017-06-30 PROCEDURE — 99233 SBSQ HOSP IP/OBS HIGH 50: CPT

## 2017-06-30 RX ORDER — SENNA PLUS 8.6 MG/1
2 TABLET ORAL AT BEDTIME
Qty: 0 | Refills: 0 | Status: DISCONTINUED | OUTPATIENT
Start: 2017-06-30 | End: 2017-07-03

## 2017-06-30 RX ORDER — SODIUM CHLORIDE 9 MG/ML
1000 INJECTION INTRAMUSCULAR; INTRAVENOUS; SUBCUTANEOUS
Qty: 0 | Refills: 0 | Status: COMPLETED | OUTPATIENT
Start: 2017-06-30 | End: 2017-06-30

## 2017-06-30 RX ORDER — IPRATROPIUM BROMIDE 0.2 MG/ML
500 SOLUTION, NON-ORAL INHALATION EVERY 6 HOURS
Qty: 0 | Refills: 0 | Status: DISCONTINUED | OUTPATIENT
Start: 2017-06-30 | End: 2017-07-03

## 2017-06-30 RX ORDER — IPRATROPIUM/ALBUTEROL SULFATE 18-103MCG
3 AEROSOL WITH ADAPTER (GRAM) INHALATION EVERY 6 HOURS
Qty: 0 | Refills: 0 | Status: DISCONTINUED | OUTPATIENT
Start: 2017-06-30 | End: 2017-06-30

## 2017-06-30 RX ORDER — IPRATROPIUM BROMIDE 0.2 MG/ML
1 SOLUTION, NON-ORAL INHALATION EVERY 6 HOURS
Qty: 0 | Refills: 0 | Status: DISCONTINUED | OUTPATIENT
Start: 2017-06-30 | End: 2017-06-30

## 2017-06-30 RX ADMIN — HEPARIN SODIUM 5000 UNIT(S): 5000 INJECTION INTRAVENOUS; SUBCUTANEOUS at 22:11

## 2017-06-30 RX ADMIN — Medication 250 MILLIGRAM(S): at 18:07

## 2017-06-30 RX ADMIN — Medication 250 MILLIGRAM(S): at 05:45

## 2017-06-30 RX ADMIN — PIPERACILLIN AND TAZOBACTAM 25 GRAM(S): 4; .5 INJECTION, POWDER, LYOPHILIZED, FOR SOLUTION INTRAVENOUS at 05:33

## 2017-06-30 RX ADMIN — ATORVASTATIN CALCIUM 40 MILLIGRAM(S): 80 TABLET, FILM COATED ORAL at 22:11

## 2017-06-30 RX ADMIN — GABAPENTIN 300 MILLIGRAM(S): 400 CAPSULE ORAL at 05:33

## 2017-06-30 RX ADMIN — GABAPENTIN 300 MILLIGRAM(S): 400 CAPSULE ORAL at 22:11

## 2017-06-30 RX ADMIN — Medication 12.5 MILLIGRAM(S): at 09:47

## 2017-06-30 RX ADMIN — SODIUM CHLORIDE 75 MILLILITER(S): 9 INJECTION INTRAMUSCULAR; INTRAVENOUS; SUBCUTANEOUS at 12:27

## 2017-06-30 RX ADMIN — GABAPENTIN 300 MILLIGRAM(S): 400 CAPSULE ORAL at 13:49

## 2017-06-30 RX ADMIN — Medication 12.5 MILLIGRAM(S): at 20:39

## 2017-06-30 RX ADMIN — HEPARIN SODIUM 5000 UNIT(S): 5000 INJECTION INTRAVENOUS; SUBCUTANEOUS at 05:33

## 2017-06-30 RX ADMIN — PIPERACILLIN AND TAZOBACTAM 25 GRAM(S): 4; .5 INJECTION, POWDER, LYOPHILIZED, FOR SOLUTION INTRAVENOUS at 13:50

## 2017-06-30 RX ADMIN — PIPERACILLIN AND TAZOBACTAM 25 GRAM(S): 4; .5 INJECTION, POWDER, LYOPHILIZED, FOR SOLUTION INTRAVENOUS at 22:11

## 2017-06-30 RX ADMIN — Medication 81 MILLIGRAM(S): at 12:27

## 2017-06-30 RX ADMIN — HEPARIN SODIUM 5000 UNIT(S): 5000 INJECTION INTRAVENOUS; SUBCUTANEOUS at 13:49

## 2017-06-30 NOTE — PROGRESS NOTE ADULT - ASSESSMENT
76yoF hx CAD/CAGB p/w hypotension requiring pressor support and hypoxia s/p bronchoscopy - both now resolved. Patient has bilateral infiltrates in context of mild eosinophilia, lack of improvement in antibiotics, and lack of fever - likely noninfectious pna, such as eosinophilic and cryptofenic pna.

## 2017-06-30 NOTE — PROGRESS NOTE ADULT - SUBJECTIVE AND OBJECTIVE BOX
CHIEF COMPLAINT: Patient is a 76y old  Female who presents with a chief complaint of hypotension hypoxia s/p bronchoscopy (28 Jun 2017 14:44)      Interval Events:    REVIEW OF SYSTEMS:  Constitutional: [ ] fevers [ ] chills [ ] weight loss [ ] weight gain  HEENT: [ ] dry eyes [ ] eye irritation   CV: [ ] chest pain [ ] orthopnea [ ] palpitations   Resp: [ ] cough [ ] shortness of breath [ ] dyspnea [ ] wheezing [ ] sputum [ ] hemoptysis  GI: [ ] nausea [ ] vomiting [ ] diarrhea [ ] constipation [ ] abd pain [ ] dysphagia   : [ ] dysuria [ ] hematuria [ ] increased urinary frequency  Musculoskeletal: [ ] back pain [ ] myalgias [ ] arthralgias  Skin: [ ] rash [ ] itch  Neurological: [ ] headache [ ] dizziness [ ] syncope [ ] weakness [ ] numbness  Psychiatric: [ ] anxiety [ ] depression  Hematologic/Lymphatic: [ ] anemia [ ] bleeding problem  Allergic/Immunologic: [ ] itchy eyes [ ] nasal discharge [ ] hives [ ] angioedema  [ ] All other systems negative  [ ] Unable to assess ROS because ________    OBJECTIVE:  ICU Vital Signs Last 24 Hrs  T(C): 36.2 (30 Jun 2017 04:00), Max: 37.1 (30 Jun 2017 00:00)  T(F): 97.2 (30 Jun 2017 04:00), Max: 98.8 (30 Jun 2017 00:00)  HR: 76 (30 Jun 2017 07:00) (70 - 126)  BP: 126/59 (30 Jun 2017 07:00) (74/40 - 126/59)  BP(mean): 85 (30 Jun 2017 07:00) (52 - 85)  ABP: --  ABP(mean): --  RR: 25 (30 Jun 2017 07:00) (21 - 59)  SpO2: 96% (30 Jun 2017 07:00) (85% - 100%)        06-29 @ 07:01  -  06-30 @ 07:00  --------------------------------------------------------  IN:    IV PiggyBack: 200 mL    norepinephrine Infusion: 12.1 mL    sodium chloride 0.9%.: 900 mL  Total IN: 1112.1 mL    OUT:  Total OUT: 0 mL    Total NET: 1112.1 mL      06-30 @ 07:01  -  06-30 @ 08:17  --------------------------------------------------------  IN:    sodium chloride 0.9%.: 75 mL  Total IN: 75 mL    OUT:  Total OUT: 0 mL    Total NET: 75 mL        Weight (kg): 58.7 (06-28 @ 11:45)  CAPILLARY BLOOD GLUCOSE          PHYSICAL EXAM:  Neuro:    Pulm:    C/V:    GI/:    Lymph:    Neck:    LINES:    HOSPITAL MEDICATIONS:  MEDICATIONS  (STANDING):  aspirin  chewable 81 milliGRAM(s) Oral daily  gabapentin 300 milliGRAM(s) Oral three times a day  atorvastatin 40 milliGRAM(s) Oral at bedtime  piperacillin/tazobactam IVPB. 3.375 Gram(s) IV Intermittent every 8 hours  vancomycin  IVPB 1000 milliGRAM(s) IV Intermittent every 12 hours  heparin  Injectable 5000 Unit(s) SubCutaneous every 8 hours  metoprolol 12.5 milliGRAM(s) Oral two times a day  sodium chloride 0.9%. 1000 milliLiter(s) (75 mL/Hr) IV Continuous <Continuous>  senna 2 Tablet(s) Oral at bedtime    MEDICATIONS  (PRN):      LABS:                        8.7    13.8  )-----------( 343      ( 30 Jun 2017 02:55 )             26.0 ,                       11.0   20.8  )-----------( 460      ( 29 Jun 2017 03:35 )             34.5 ,                       11.3   22.4  )-----------( 467      ( 28 Jun 2017 12:46 )             33.5   Hgb Trend: 8.7<--, 11.0<--, 11.3<--    06-30    142  |  112<H>  |  13  ----------------------------<  101<H>  3.6   |  20<L>  |  0.63  06-29    140  |  99  |  10  ----------------------------<  132<H>  3.7   |  27  |  0.91  06-28    139  |  107  |  25<H>  ----------------------------<  134<H>  4.4   |  19<L>  |  0.69    Ca   7.8<L>   30 Jun 2017 02:55 /  , Ca   9.8   29 Jun 2017 11:18 /  , Ca   8.3<L>   28 Jun 2017 12:46 /    Phos  3.2  06-30 /, Phos  2.8  06-29 /, Phos  2.9  06-28 /  Mg   1.9   06-30 /, Mg   2.2   06-29 /, Mg   2.0   06-28 /  TPro  5.1<L>  /  Alb  2.1<L>  /  TBili  0.2  /  DBili  x   /  AST  15  /  ALT  16  /  AlkPhos  59  06-30  TPro  7.4  /  Alb  4.1  /  TBili  0.8  /  DBili  x   /  AST  20  /  ALT  19  /  AlkPhos  96  06-29  TPro  6.4  /  Alb  2.9<L>  /  TBili  0.3  /  DBili  x   /  AST  21  /  ALT  23  /  AlkPhos  78  06-28    Creatinine Trend: 0.63<--, 0.91<--, 0.69<--    PT/INR - ( 30 Jun 2017 02:55 )   PT: 16.6 sec;   INR: 1.51 ratio       , PT/INR - ( 29 Jun 2017 03:35 )   PT: 16.5 sec;   INR: 1.50 ratio         PTT - ( 30 Jun 2017 02:55 )  PTT:41.7 sec, PTT - ( 29 Jun 2017 03:35 )  PTT:35.7 sec    Arterial Blood Gas:  06-28 @ 17:05  7.44/34/106/23/98/-.1  ABG lactate: --    Venous Blood Gas:  06-29 @ 15:02  --/--/--/--/--  VBG Lactate: 1.8    CARDIAC MARKERS ( 29 Jun 2017 03:35 )  x / <0.01 ng/mL / 27 U/L / x / 1.5 ng/mL  CARDIAC MARKERS ( 28 Jun 2017 12:46 )  x / <0.01 ng/mL / 35 U/L / x / 1.4 ng/mL        MICROBIOLOGY:       RADIOLOGY: CHIEF COMPLAINT: Patient is a 76y old  Female who presents with a chief complaint of hypotension hypoxia s/p bronchoscopy (28 Jun 2017 14:44)      Interval Events: Became tachycardic, developed CP in context of ST changes V5-V6 on EKG s/p terbutaline injection. Tachycardia resolved with metoprolol Tx. Started back on half home dose of metoprolol (12.5mg BID). O/n patient had one episode of gwen spit.     REVIEW OF SYSTEMS:  Constitutional: [N] fevers [N] chills [ ] weight loss [ ] weight gain  HEENT: [ ] dry eyes [ ] eye irritation   CV: [N] chest pain [ ] orthopnea [N] palpitations   Resp: [P] cough [N] shortness of breath [ ] dyspnea [ ] wheezing [ ] sputum [ ] hemoptysis  GI: [ ] nausea [ ] vomiting [ ] diarrhea [ ] constipation [ ] abd pain [ ] dysphagia   : [ ] dysuria [ ] hematuria [ ] increased urinary frequency  Musculoskeletal: [ ] back pain [ ] myalgias [ ] arthralgias  Skin: [ ] rash [ ] itch  Neurological: [ ] headache [ ] dizziness [ ] syncope [ ] weakness [ ] numbness  Psychiatric: [ ] anxiety [ ] depression  Hematologic/Lymphatic: [ ] anemia [ ] bleeding problem  Allergic/Immunologic: [ ] itchy eyes [ ] nasal discharge [ ] hives [ ] angioedema  [ ] All other systems negative  [ ] Unable to assess ROS because ________    OBJECTIVE:  ICU Vital Signs Last 24 Hrs  T(C): 36.2 (30 Jun 2017 04:00), Max: 37.1 (30 Jun 2017 00:00)  T(F): 97.2 (30 Jun 2017 04:00), Max: 98.8 (30 Jun 2017 00:00)  HR: 76 (30 Jun 2017 07:00) (70 - 126)  BP: 126/59 (30 Jun 2017 07:00) (74/40 - 126/59)  BP(mean): 85 (30 Jun 2017 07:00) (52 - 85)  ABP: --  ABP(mean): --  RR: 25 (30 Jun 2017 07:00) (21 - 59)  SpO2: 96% (30 Jun 2017 07:00) (85% - 100%)        06-29 @ 07:01  -  06-30 @ 07:00  --------------------------------------------------------  IN:    IV PiggyBack: 200 mL    norepinephrine Infusion: 12.1 mL    sodium chloride 0.9%.: 900 mL  Total IN: 1112.1 mL    OUT:  Total OUT: 0 mL    Total NET: 1112.1 mL      06-30 @ 07:01  -  06-30 @ 08:17  --------------------------------------------------------  IN:    sodium chloride 0.9%.: 75 mL  Total IN: 75 mL    OUT:  Total OUT: 0 mL    Total NET: 75 mL        Weight (kg): 58.7 (06-28 @ 11:45)  CAPILLARY BLOOD GLUCOSE          PHYSICAL EXAM:  Neuro:    Pulm:    C/V:    GI/:    Lymph:    Neck:    LINES:    HOSPITAL MEDICATIONS:  MEDICATIONS  (STANDING):  aspirin  chewable 81 milliGRAM(s) Oral daily  gabapentin 300 milliGRAM(s) Oral three times a day  atorvastatin 40 milliGRAM(s) Oral at bedtime  piperacillin/tazobactam IVPB. 3.375 Gram(s) IV Intermittent every 8 hours  vancomycin  IVPB 1000 milliGRAM(s) IV Intermittent every 12 hours  heparin  Injectable 5000 Unit(s) SubCutaneous every 8 hours  metoprolol 12.5 milliGRAM(s) Oral two times a day  sodium chloride 0.9%. 1000 milliLiter(s) (75 mL/Hr) IV Continuous <Continuous>  senna 2 Tablet(s) Oral at bedtime    MEDICATIONS  (PRN):      LABS:                        8.7    13.8  )-----------( 343      ( 30 Jun 2017 02:55 )             26.0 ,                       11.0   20.8  )-----------( 460      ( 29 Jun 2017 03:35 )             34.5 ,                       11.3   22.4  )-----------( 467      ( 28 Jun 2017 12:46 )             33.5   Hgb Trend: 8.7<--, 11.0<--, 11.3<--    06-30    142  |  112<H>  |  13  ----------------------------<  101<H>  3.6   |  20<L>  |  0.63  06-29    140  |  99  |  10  ----------------------------<  132<H>  3.7   |  27  |  0.91  06-28    139  |  107  |  25<H>  ----------------------------<  134<H>  4.4   |  19<L>  |  0.69    Ca   7.8<L>   30 Jun 2017 02:55 /  , Ca   9.8   29 Jun 2017 11:18 /  , Ca   8.3<L>   28 Jun 2017 12:46 /    Phos  3.2  06-30 /, Phos  2.8  06-29 /, Phos  2.9  06-28 /  Mg   1.9   06-30 /, Mg   2.2   06-29 /, Mg   2.0   06-28 /  TPro  5.1<L>  /  Alb  2.1<L>  /  TBili  0.2  /  DBili  x   /  AST  15  /  ALT  16  /  AlkPhos  59  06-30  TPro  7.4  /  Alb  4.1  /  TBili  0.8  /  DBili  x   /  AST  20  /  ALT  19  /  AlkPhos  96  06-29  TPro  6.4  /  Alb  2.9<L>  /  TBili  0.3  /  DBili  x   /  AST  21  /  ALT  23  /  AlkPhos  78  06-28    Creatinine Trend: 0.63<--, 0.91<--, 0.69<--    PT/INR - ( 30 Jun 2017 02:55 )   PT: 16.6 sec;   INR: 1.51 ratio       , PT/INR - ( 29 Jun 2017 03:35 )   PT: 16.5 sec;   INR: 1.50 ratio         PTT - ( 30 Jun 2017 02:55 )  PTT:41.7 sec, PTT - ( 29 Jun 2017 03:35 )  PTT:35.7 sec    Arterial Blood Gas:  06-28 @ 17:05  7.44/34/106/23/98/-.1  ABG lactate: --    Venous Blood Gas:  06-29 @ 15:02  --/--/--/--/--  VBG Lactate: 1.8    CARDIAC MARKERS ( 29 Jun 2017 03:35 )  x / <0.01 ng/mL / 27 U/L / x / 1.5 ng/mL  CARDIAC MARKERS ( 28 Jun 2017 12:46 )  x / <0.01 ng/mL / 35 U/L / x / 1.4 ng/mL        MICROBIOLOGY:       RADIOLOGY: CHIEF COMPLAINT: Patient is a 76y old  Female who presents with a chief complaint of hypotension hypoxia s/p bronchoscopy (28 Jun 2017 14:44)      Interval Events: Became tachycardic, developed CP in context of ST changes V5-V6 on EKG s/p terbutaline injection. Tachycardia resolved with metoprolol Tx. Started back on half home dose of metoprolol (12.5mg BID). O/n patient had one episode of gwen spit. Patient denied CP, palpitations, SOB, melena, n/v.    REVIEW OF SYSTEMS:  Constitutional: [N] fevers [N] chills [ ] weight loss [ ] weight gain  HEENT: [ ] dry eyes [ ] eye irritation   CV: [N] chest pain [ ] orthopnea [N] palpitations   Resp: [P] cough [N] shortness of breath [ ] dyspnea [ ] wheezing [ ] sputum [ ] hemoptysis  GI: [ ] nausea [ ] vomiting [ ] diarrhea [ ] constipation [ ] abd pain [ ] dysphagia   : [ ] dysuria [ ] hematuria [ ] increased urinary frequency  Musculoskeletal: [ ] back pain [ ] myalgias [ ] arthralgias  Skin: [ ] rash [ ] itch  Neurological: [ ] headache [ ] dizziness [ ] syncope [ ] weakness [ ] numbness  Psychiatric: [ ] anxiety [ ] depression  Hematologic/Lymphatic: [ ] anemia [ ] bleeding problem  Allergic/Immunologic: [ ] itchy eyes [ ] nasal discharge [ ] hives [ ] angioedema  [ ] All other systems negative  [x] Unable to assess ROS because ________    OBJECTIVE:  ICU Vital Signs Last 24 Hrs  T(C): 36.2 (30 Jun 2017 04:00), Max: 37.1 (30 Jun 2017 00:00)  T(F): 97.2 (30 Jun 2017 04:00), Max: 98.8 (30 Jun 2017 00:00)  HR: 76 (30 Jun 2017 07:00) (70 - 126)  BP: 126/59 (30 Jun 2017 07:00) (74/40 - 126/59)  BP(mean): 85 (30 Jun 2017 07:00) (52 - 85)  ABP: --  ABP(mean): --  RR: 25 (30 Jun 2017 07:00) (21 - 59)  SpO2: 96% (30 Jun 2017 07:00) (85% - 100%)        06-29 @ 07:01 - 06-30 @ 07:00  --------------------------------------------------------  IN:    IV PiggyBack: 200 mL    norepinephrine Infusion: 12.1 mL    sodium chloride 0.9%.: 900 mL  Total IN: 1112.1 mL    OUT:  Total OUT: 0 mL    Total NET: 1112.1 mL      06-30 @ 07:01 - 06-30 @ 08:17  --------------------------------------------------------  IN:    sodium chloride 0.9%.: 75 mL  Total IN: 75 mL    OUT:  Total OUT: 0 mL    Total NET: 75 mL        Weight (kg): 58.7 (06-28 @ 11:45)  CAPILLARY BLOOD GLUCOSE        PHYSICAL EXAM:  Neuro: A&Ox3    Pulm: CTABL    C/V: normal S1S2, no murmurs     GI/: abdomen soft, non-tender, non-distended    Lymph: no extremity edema or cervical lymphadenopathy appreciated.     Neck: supple, ROM intact.     LINES:    HOSPITAL MEDICATIONS:  MEDICATIONS  (STANDING):  aspirin  chewable 81 milliGRAM(s) Oral daily  gabapentin 300 milliGRAM(s) Oral three times a day  atorvastatin 40 milliGRAM(s) Oral at bedtime  piperacillin/tazobactam IVPB. 3.375 Gram(s) IV Intermittent every 8 hours  vancomycin  IVPB 1000 milliGRAM(s) IV Intermittent every 12 hours  heparin  Injectable 5000 Unit(s) SubCutaneous every 8 hours  metoprolol 12.5 milliGRAM(s) Oral two times a day  sodium chloride 0.9%. 1000 milliLiter(s) (75 mL/Hr) IV Continuous <Continuous>  senna 2 Tablet(s) Oral at bedtime    MEDICATIONS  (PRN):      LABS:                        8.7    13.8  )-----------( 343      ( 30 Jun 2017 02:55 )             26.0 ,                       11.0   20.8  )-----------( 460      ( 29 Jun 2017 03:35 )             34.5 ,                       11.3   22.4  )-----------( 467      ( 28 Jun 2017 12:46 )             33.5   Hgb Trend: 8.7<--, 11.0<--, 11.3<--    06-30    142  |  112<H>  |  13  ----------------------------<  101<H>  3.6   |  20<L>  |  0.63  06-29    140  |  99  |  10  ----------------------------<  132<H>  3.7   |  27  |  0.91  06-28    139  |  107  |  25<H>  ----------------------------<  134<H>  4.4   |  19<L>  |  0.69    Ca   7.8<L>   30 Jun 2017 02:55 /  , Ca   9.8   29 Jun 2017 11:18 /  , Ca   8.3<L>   28 Jun 2017 12:46 /    Phos  3.2  06-30 /, Phos  2.8  06-29 /, Phos  2.9  06-28 /  Mg   1.9   06-30 /, Mg   2.2   06-29 /, Mg   2.0   06-28 /  TPro  5.1<L>  /  Alb  2.1<L>  /  TBili  0.2  /  DBili  x   /  AST  15  /  ALT  16  /  AlkPhos  59  06-30  TPro  7.4  /  Alb  4.1  /  TBili  0.8  /  DBili  x   /  AST  20  /  ALT  19  /  AlkPhos  96  06-29  TPro  6.4  /  Alb  2.9<L>  /  TBili  0.3  /  DBili  x   /  AST  21  /  ALT  23  /  AlkPhos  78  06-28    Creatinine Trend: 0.63<--, 0.91<--, 0.69<--    PT/INR - ( 30 Jun 2017 02:55 )   PT: 16.6 sec;   INR: 1.51 ratio       , PT/INR - ( 29 Jun 2017 03:35 )   PT: 16.5 sec;   INR: 1.50 ratio         PTT - ( 30 Jun 2017 02:55 )  PTT:41.7 sec, PTT - ( 29 Jun 2017 03:35 )  PTT:35.7 sec    Arterial Blood Gas:  06-28 @ 17:05  7.44/34/106/23/98/-.1  ABG lactate: --    Venous Blood Gas:  06-29 @ 15:02  --/--/--/--/--  VBG Lactate: 1.8    CARDIAC MARKERS ( 29 Jun 2017 03:35 )  x / <0.01 ng/mL / 27 U/L / x / 1.5 ng/mL  CARDIAC MARKERS ( 28 Jun 2017 12:46 )  x / <0.01 ng/mL / 35 U/L / x / 1.4 ng/mL        MICROBIOLOGY: BAL Cx - normal raj, gram stain - rare gram positive cocci in pairs, negative for acid-fast bacilli. [id number].      RADIOLOGY:  CXR - redemonstrated multifocal pna.

## 2017-06-30 NOTE — PROGRESS NOTE ADULT - SUBJECTIVE AND OBJECTIVE BOX
Accept Note    77 yo F hx of CAD, CAG(2015), admitted to MICU after hypoxic event desat to 80s and hypotensive event requiring pressors during bronchoscopy. CXR initially showed bilateral infiltrates with peripheral pattern with elevated WBC. CT chest 6/22 showed extensive consolidations with air bronchograms, lavage done showed rare gram postive cocci in pairs. No improvement on ABx, afebrile with dry cough suggest non infectious cause of PNA. Original plan was for patient to get a VAT but unclear right now. Treating empircally with vanc/zosyn. Patient hemodynamically stable and transfered to medicine. MICU course complicated by PIV infiltration of levophed, treated with terbutaline showing tachycardia, CP with V5-V6 changes with resolution upon metoprolol tx.     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 Repeat CXR demonstrates persistent bilateral infiltrates which are peripheral in nature and wbc of 19.CT chest from 6/22 demonstrated extensive consolidations containing air bronchograms in both lungs  Pt s/p planed bronchoscopy c/b by hypotension SBP 80's and hypoxia to 80's-90's requiring received 1liter NS, clif gtt placed on NRB. during procedure pt received propofol gtt fentanyl 100 ug, benedryl 25 mg zofran 4mg.    bronch culture demonstrates rare gram positive cocci in pairs (28 Jun 2017 14:44)      VITAL SIGNS:    Vital Signs Last 24 Hrs  T(C): 37 (30 Jun 2017 17:53), Max: 37.1 (30 Jun 2017 00:00)  T(F): 98.6 (30 Jun 2017 17:53), Max: 98.8 (30 Jun 2017 00:00)  HR: 84 (30 Jun 2017 17:53) (73 - 100)  BP: 106/69 (30 Jun 2017 17:53) (77/43 - 126/59)  BP(mean): 69 (30 Jun 2017 16:00) (57 - 85)  RR: 28 (30 Jun 2017 17:53) (21 - 59)  SpO2: 94% (30 Jun 2017 17:53) (85% - 100%)    I&O's Summary    29 Jun 2017 07:01  -  30 Jun 2017 07:00  --------------------------------------------------------  IN: 1112.1 mL / OUT: 0 mL / NET: 1112.1 mL    30 Jun 2017 07:01  -  30 Jun 2017 19:42  --------------------------------------------------------  IN: 925 mL / OUT: 0 mL / NET: 925 mL          PHYSICAL EXAM:     GENERAL: no acute distress  HEENT: NC/AT, EOMI, neck supple, MMM  RESPIRATORY: crackles left base, diminished sounds bases, CTA R and L upper lobes  CARDIOVASCULAR: RRR, no murmurs, gallops, rubs  ABDOMINAL: soft, non-tender, non-distended, positive bowel sounds   EXTREMITIES: no clubbing, cyanosis, or edema  NEUROLOGICAL: alert and oriented x 3, non-focal  SKIN: no rashes or lesions   MUSCULOSKELETAL: no gross joint deformity                          8.7    13.8  )-----------( 343      ( 30 Jun 2017 02:55 )             26.0     06-30    142  |  112<H>  |  13  ----------------------------<  101<H>  3.6   |  20<L>  |  0.63    Ca    7.8<L>      30 Jun 2017 02:55  Phos  3.2     06-30  Mg     1.9     06-30    TPro  5.1<L>  /  Alb  2.1<L>  /  TBili  0.2  /  DBili  x   /  AST  15  /  ALT  16  /  AlkPhos  59  06-30      CAPILLARY BLOOD GLUCOSE          MEDICATIONS  (STANDING):  aspirin  chewable 81 milliGRAM(s) Oral daily  gabapentin 300 milliGRAM(s) Oral three times a day  atorvastatin 40 milliGRAM(s) Oral at bedtime  piperacillin/tazobactam IVPB. 3.375 Gram(s) IV Intermittent every 8 hours  vancomycin  IVPB 1000 milliGRAM(s) IV Intermittent every 12 hours  heparin  Injectable 5000 Unit(s) SubCutaneous every 8 hours  metoprolol 12.5 milliGRAM(s) Oral two times a day  sodium chloride 0.9%. 1000 milliLiter(s) (75 mL/Hr) IV Continuous <Continuous>  senna 2 Tablet(s) Oral at bedtime      Allergies    codeine (Nausea)    Intolerances        Radiology

## 2017-06-30 NOTE — PROGRESS NOTE ADULT - ASSESSMENT
77 y/o female with h/o CAD s/p CABG 2015, recent PNA 5/2017 s/p treatment with azithromycin and levofloxacin without improvement of dyspnea or cough, found to have peripheral eosinophilia and persistent infiltrates on CXR, presented initially for planned bronchoscopy for biopsy with procedure c/b hypotension and hypoxia requiring pressor support and MICU admission for monitoring, now doing well off pressors.    # Neuro: awake, alert, off sedation. No deficits    # Cardiac:   Questionable changes on admission EKG, but patient without chest pain and cardiac enzymes negative x2  Patient is s/p CABG 2 years ago, c/w ASA amd statin  Initially requiring phenylephrine for pressor support, now successfully titrated off pressors    #Respiratory:  Patient presented for planned bronch for evaluation of persistent cough, dyspnea, peripheral eosinophilia and persistent infiltrates on chest imaging  Patient hypoxic during bronchoscopy, initially requiring NRB for support, now doing well on O2NC  C/w duoneb PRN  Wean O2 NC as tolerated  Bronch showing rare GPC in pairs, 380 nucleated cells, only 7% eosinophils, less c/w eosinophilic PNA  C/w vancomycin and zosyn for now for empiric coverage  F/u BCx, bronchial cultures  F/u pulmonology recs Dr Munson, patient may need VATS procedure if diagnosis remains unclear     ID:   Patient presented for planned bronch for evaluation of persistent cough, dyspnea, peripheral eosinophilia and persistent infiltrates on chest imaging  Bronch showing rare GPC in pairs, 380 nucleated cells, only 7% eosinophils, less c/w eosinophilic PNA  C/w vancomycin and zosyn for now for empiric coverage  F/u BCx, bronchial cultures    Renal: Stable, Cr at baseline    GI: Stable, c/w DASH diet    Endocrine: no h/o diabetes, no need for HISS    FEN: on DASH diet  DVT PPX: HSQ  Dispo: full code 76yoF hx CAD/CAGB p/w hypotension requiring pressor support and hypoxia s/p bronchoscopy - both now resolved. Patient has bilateral infiltrates in context of mild eosinophilia, lack of improvement in antibiotics, and lack of fever - likely noninfectious pna, such as eosinophilic and cryptofenic pna.     # Neuro: awake, alert, off sedation.   -No acute issues.    # Cardiac:   Questionable changes on admission EKG, but patient without chest pain and cardiac enzymes negative x2. Patient with episode of tachy, CP, ST changes on EKG s/p terbutaline injection - resolved after metoprolol treatment. HDS off pressors since last night. Hyperdynamic LV on POCUS, indicating some fluid depletion.   -Appreciate cardiology recs. Restart on half of home dose of metoprolol (12.5mg BID).  -c/w ASA, statin  -Initially requiring phenylephrine for pressor support, now successfully titrated off pressors  -NS@75    #Respiratory:  Patient presented for planned bronch for evaluation of persistent cough, dyspnea, peripheral eosinophilia and persistent infiltrates on chest imaging  Patient hypoxic during bronchoscopy, initially requiring NRB for support, now doing well on O2NC.  -C/w duoneb PRN, atrovent treatment.   -Wean O2 NC as tolerated  -C/w vancomycin and zosyn for now for empiric coverage  -F/u BCx, bronchial cultures  -F/u pulmonology recs Dr Munson, patient may need VATS procedure if diagnosis remains unclear     ID:   Patient presented for planned bronch for evaluation of persistent cough, dyspnea, peripheral eosinophilia and persistent infiltrates on chest imaging  Bronch showing rare GPC in pairs, 380 nucleated cells, only 7% eosinophils, less c/w eosinophilic PNA. BCx NGTD, BAL Cx shows normal raj.   -C/w vancomycin and zosyn for now for empiric coverage    Renal: Stable, Cr at baseline  - no acute issues.     GI: Stable, no acute issues.   -c/w DASH diet    Endocrine: no h/o diabetes, no need for HISS    FEN: on DASH diet  DVT PPX: HSQ  Dispo: full code    FOR FOLLOW UP:  [ ] f/u cortisol levels for stress test  [ ] BAL flow cytometry   [ ]f/u Cx's  [ ]f/u BAL flow cytometry    [ ] vanc trough with pm dose on 7/1. 76yoF hx CAD/CAGB p/w hypotension requiring pressor support and hypoxia s/p bronchoscopy - both now resolved. Patient has bilateral infiltrates in context of mild eosinophilia, lack of improvement in antibiotics, and lack of fever - likely noninfectious pna, such as eosinophilic and cryptofenic pna.     # Neuro: awake, alert, off sedation.   -No acute issues.    # Cardiac:   Questionable changes on admission EKG, but patient without chest pain and cardiac enzymes negative x2. Patient with episode of tachy, CP, ST changes on EKG s/p terbutaline injection - resolved after metoprolol treatment. HDS off pressors since last night. Hyperdynamic LV on POCUS, indicating some fluid depletion.   -Appreciate cardiology recs. Restart on half of home dose of metoprolol (12.5mg BID).  -c/w ASA, statin  -Initially requiring phenylephrine for pressor support, now successfully titrated off pressors  -NS@75    #Respiratory:  Patient presented for planned bronch for evaluation of persistent cough, dyspnea, peripheral eosinophilia and persistent infiltrates on chest imaging  Patient hypoxic during bronchoscopy, initially requiring NRB for support, now doing well on O2NC.  -C/w duoneb PRN, atrovent treatment, chest PT, IS.   -Wean O2 NC as tolerated  -C/w vancomycin and zosyn for now for empiric coverage  -F/u BCx, bronchial cultures  -F/u pulmonology recs Dr Munson, patient may need VATS procedure if diagnosis remains unclear     ID:   Patient presented for planned bronch for evaluation of persistent cough, dyspnea, peripheral eosinophilia and persistent infiltrates on chest imaging  Bronch showing rare GPC in pairs, 380 nucleated cells, only 7% eosinophils, less c/w eosinophilic PNA. BCx NGTD, BAL Cx shows normal raj.   -C/w vancomycin and zosyn for now for empiric coverage    Renal: Stable, Cr at baseline  - no acute issues.     GI: Stable, no acute issues.   -c/w DASH diet    Endocrine: no h/o diabetes, no need for HISS    FEN: on DASH diet  DVT PPX: HSQ  Dispo: full code    FOR FOLLOW UP:  [ ] f/u cortisol levels for stress test  [ ] BAL flow cytometry   [ ]f/u Cx's  [ ]f/u BAL flow cytometry    [ ] vanc trough with pm dose on 7/1.

## 2017-06-30 NOTE — CHART NOTE - NSCHARTNOTEFT_GEN_A_CORE
Repeat CXR showed bilateral infiltrates with a peripheral pattern in the context of elevated white count at 19. CT Chest on the 22nd showed extensive consolidations with air bronchograms. Bronchiole lavage have shown rare gram positive cocci in pairs on gram stain and bronchial cultures have grown normal raj.  Moving bilateral infiltrates in context of eosinophilia, lack of improvement on Abx, lack of fever, dry cough suggests noninfectious causes of pna, such as eosinophilic vs. cryptogenic pna. The treatment for these noninfectious pneumonias would be steroids, but the plan is to hold off on treatment until diagnosis is confirmed with biopsy during VATs. Meanwhile, patient has been treated empirically with Vanc and Zosyn. As workup for etiology of hypotension, cortisol levels before and after stress dose of cosyntropin - results are still pending. On second night of admission, patient weaned off phenylephrine with blood pressure remaining at her usual baseline BPs (per patient, systolic 90s to low 100s and diastolic at 60-70 at baseline) and saturating well on low flow NC. Patient has been hemodynamically stable and will be transferred to medical floors under Dr. Farmer. MICU course has been complicated by tachycardia, CP with ST changes in V5-V6 s/p terbutaline injection at PIV infiltrate site. Tachycardia and symptoms resolved s/p metoprolol treatment. Repeat cardiac enzymes have been negative.     ASSESSMENT & PLAN:     76yoF hx CAD/CAGB p/w hypotension requiring pressor support and hypoxia s/p bronchoscopy - both now resolved. Patient has bilateral infiltrates in context of mild eosinophilia, lack of improvement in antibiotics, and lack of fever - likely noninfectious pna, such as eosinophilic and cryptofenic pna.     # Neuro: awake, alert, off sedation.   -No acute issues.    # Cardiac:   Questionable changes on admission EKG, but patient without chest pain and cardiac enzymes negative x2. Patient with episode of tachy, CP, ST changes on EKG s/p terbutaline injection - resolved after metoprolol treatment. HDS off pressors since last night. Hyperdynamic LV on POCUS, indicating some fluid depletion.   -Appreciate cardiology recs. Restart on half of home dose of metoprolol (12.5mg BID).  -c/w ASA, statin  -Initially requiring phenylephrine for pressor support, now successfully titrated off pressors  -NS@75    #Respiratory:  Patient presented for planned bronch for evaluation of persistent cough, dyspnea, peripheral eosinophilia and persistent infiltrates on chest imaging  Patient hypoxic during bronchoscopy, initially requiring NRB for support, now doing well on O2NC.  -C/w duoneb PRN, atrovent treatment.   -Wean O2 NC as tolerated  -C/w vancomycin and zosyn for now for empiric coverage  -F/u BCx, bronchial cultures  -F/u pulmonology recs Dr Munson, patient may need VATS procedure if diagnosis remains unclear     ID:   Patient presented for planned bronch for evaluation of persistent cough, dyspnea, peripheral eosinophilia and persistent infiltrates on chest imaging  Bronch showing rare GPC in pairs, 380 nucleated cells, only 7% eosinophils, less c/w eosinophilic PNA. BCx NGTD, BAL Cx shows normal raj.   -C/w vancomycin and zosyn for now for empiric coverage    Renal: Stable, Cr at baseline  - no acute issues.     GI: Stable, no acute issues.   -c/w DASH diet    Endocrine: no h/o diabetes, no need for HISS    FEN: on DASH diet  DVT PPX: HSQ  Dispo: full code    FOR FOLLOW UP:  [ ] f/u cortisol levels for stress test  [ ] BAL flow cytometry   [ ]f/u Cx's  [ ]f/u BAL flow cytometry    [ ] vanc trough with pm dose on 7/1. MICU Transfer Note    Transfer from: MICU    Transfer to: (x) Medicine    (  ) Telemetry     (   ) RCU        (    ) Palliative         (   ) Stroke Unit          (   ) __________________    Accepting Physician: Dr. Farmer, hospitalist.   Signout given to:     MICU COURSE:     76yoF hx of CAD, CABG (2011) a/w hypoxia with desat to 80s and hypotension requiring pressors during bronchoscopy. Beginning of June, patient began experiencing cough and SOB and visited PCP, who treated her with Azithromycin and Levofloxacin without resolution. She underwent elective bronchoscopy for a workup of noninfectious causes, where she desaturated to 80s and became hypotensive with systolic in the 80s requiring 1L NS, clif infusion, and nonrebreather. Repeat CXR showed bilateral infiltrates with a peripheral pattern in the context of elevated white count at 19. CT Chest on the 22nd showed extensive consolidations with air bronchograms. Bronchiole lavage have shown rare gram positive cocci in pairs on gram stain and bronchial cultures have grown normal raj.  Moving bilateral infiltrates in context of eosinophilia, lack of improvement on Abx, lack of fever, dry cough suggests noninfectious causes of pna, such as eosinophilic vs. cryptogenic pna. The treatment for these noninfectious pneumonias would be steroids, but the plan is to hold off on treatment until diagnosis is confirmed with biopsy during VATs. Meanwhile, patient has been treated empirically with Vanc and Zosyn. As workup for etiology of hypotension, cortisol levels before and after stress dose of cosyntropin - results are still pending. On second night of admission, patient weaned off phenylephrine with blood pressure remaining at her usual baseline BPs (per patient, systolic 90s to low 100s and diastolic at 60-70 at baseline) and saturating well on low flow NC. Patient has been hemodynamically stable and will be transferred to medical floors under Dr. Farmer. MICU course has been complicated by tachycardia, CP with ST changes in V5-V6 s/p terbutaline injection at PIV infiltrate site. Tachycardia and symptoms resolved s/p metoprolol treatment. Repeat cardiac enzymes have been negative.     ASSESSMENT & PLAN:     76yoF hx CAD/CAGB p/w hypotension requiring pressor support and hypoxia s/p bronchoscopy - both now resolved. Patient has bilateral infiltrates in context of mild eosinophilia, lack of improvement in antibiotics, and lack of fever - likely noninfectious pna, such as eosinophilic and cryptofenic pna.     # Neuro: awake, alert, off sedation.   -No acute issues.    # Cardiac:   Questionable changes on admission EKG, but patient without chest pain and cardiac enzymes negative x2. Patient with episode of tachy, CP, ST changes on EKG s/p terbutaline injection - resolved after metoprolol treatment. HDS off pressors since last night. Hyperdynamic LV on POCUS, indicating some fluid depletion.   -Appreciate cardiology recs. Restart on half of home dose of metoprolol (12.5mg BID).  -c/w ASA, statin  -Initially requiring phenylephrine for pressor support, now successfully titrated off pressors  -NS@75    #Respiratory:  Patient presented for planned bronch for evaluation of persistent cough, dyspnea, peripheral eosinophilia and persistent infiltrates on chest imaging  Patient hypoxic during bronchoscopy, initially requiring NRB for support, now doing well on O2NC.  -C/w duoneb PRN, atrovent treatment.   -Wean O2 NC as tolerated  -C/w vancomycin and zosyn for now for empiric coverage  -F/u BCx, bronchial cultures  -F/u pulmonology recs Dr Munson, patient may need VATS procedure if diagnosis remains unclear     ID:   Patient presented for planned bronch for evaluation of persistent cough, dyspnea, peripheral eosinophilia and persistent infiltrates on chest imaging  Bronch showing rare GPC in pairs, 380 nucleated cells, only 7% eosinophils, less c/w eosinophilic PNA. BCx NGTD, BAL Cx shows normal raj.   -C/w vancomycin and zosyn for now for empiric coverage    Renal: Stable, Cr at baseline  - no acute issues.     GI: Stable, no acute issues.   -c/w DASH diet    Endocrine: no h/o diabetes, no need for HISS    FEN: on DASH diet  DVT PPX: HSQ  Dispo: full code    FOR FOLLOW UP:  [ ] f/u cortisol levels for stress test  [ ] BAL flow cytometry   [ ]f/u Cx's  [ ]f/u BAL flow cytometry    [ ] vanc trough with pm dose on 7/1. MICU Transfer Note    Transfer from: MICU    Transfer to: (x) Medicine    (  ) Telemetry     (   ) RCU        (    ) Palliative         (   ) Stroke Unit          (   ) __________________    Accepting Physician: Dr. Farmer, hospitalist.   Signout given to:     MICU COURSE:     76yoF hx of CAD, CABG (2011) a/w hypoxia with desat to 80s and hypotension requiring pressors during bronchoscopy. Beginning of June, patient began experiencing cough and SOB and visited PCP, who treated her with Azithromycin and Levofloxacin without resolution. She underwent elective bronchoscopy for a workup of noninfectious causes, where she desaturated to 80s and became hypotensive with systolic in the 80s requiring 1L NS, clif infusion, and nonrebreather. Repeat CXR showed bilateral infiltrates with a peripheral pattern in the context of elevated white count at 19. CT Chest on the 22nd showed extensive consolidations with air bronchograms. Bronchiole lavage have shown rare gram positive cocci in pairs on gram stain and bronchial cultures have grown normal raj.  Moving bilateral infiltrates in context of eosinophilia, lack of improvement on Abx, lack of fever, dry cough suggests noninfectious causes of pna, such as eosinophilic vs. cryptogenic pna. The treatment for these noninfectious pneumonias would be steroids, but the plan is to hold off on treatment until diagnosis is confirmed with biopsy during VATs. Meanwhile, patient has been treated empirically with Vanc and Zosyn. As workup for etiology of hypotension, cortisol levels before and after stress dose of cosyntropin - results are still pending. On second night of admission, patient weaned off phenylephrine with blood pressure remaining at her usual baseline BPs (per patient, systolic 90s to low 100s and diastolic at 60-70 at baseline) and saturating well on low flow NC. Patient has been hemodynamically stable and will be transferred to medical floors under Dr. Farmer. MICU course has been complicated by tachycardia, CP with ST changes in V5-V6 s/p terbutaline injection at PIV infiltrate site. Tachycardia and symptoms resolved s/p metoprolol treatment. Repeat cardiac enzymes have been negative.     ASSESSMENT & PLAN:     76yoF hx CAD/CAGB p/w hypotension requiring pressor support and hypoxia s/p bronchoscopy - both now resolved. Patient has bilateral infiltrates in context of mild eosinophilia, lack of improvement in antibiotics, and lack of fever - likely noninfectious pna, such as eosinophilic and cryptofenic pna.     # Neuro: awake, alert, off sedation.   -No acute issues.    # Cardiac:   Questionable changes on admission EKG, but patient without chest pain and cardiac enzymes negative x2. Patient with episode of tachy, CP, ST changes on EKG s/p terbutaline injection - resolved after metoprolol treatment. HDS off pressors since last night. Hyperdynamic LV on POCUS, indicating some fluid depletion.   -Appreciate cardiology recs. Restart on half of home dose of metoprolol (12.5mg BID).  -c/w ASA, statin  -Initially requiring phenylephrine for pressor support, now successfully titrated off pressors  -NS@75    #Respiratory:  Patient presented for planned bronch for evaluation of persistent cough, dyspnea, peripheral eosinophilia and persistent infiltrates on chest imaging  Patient hypoxic during bronchoscopy, initially requiring NRB for support, now doing well on O2NC.  -C/w duoneb PRN, atrovent treatment, chest PT, IS.   -Wean O2 NC as tolerated  -C/w vancomycin and zosyn for now for empiric coverage  -F/u BCx, bronchial cultures  -F/u pulmonology recs Dr Munson, patient may need VATS procedure if diagnosis remains unclear     ID:   Patient presented for planned bronch for evaluation of persistent cough, dyspnea, peripheral eosinophilia and persistent infiltrates on chest imaging  Bronch showing rare GPC in pairs, 380 nucleated cells, only 7% eosinophils, less c/w eosinophilic PNA. BCx NGTD, BAL Cx shows normal raj.   -C/w vancomycin and zosyn for now for empiric coverage    Renal: Stable, Cr at baseline  - no acute issues.     GI: Stable, no acute issues.   -c/w DASH diet    Endocrine: no h/o diabetes, no need for HISS    FEN: on DASH diet  DVT PPX: HSQ  Dispo: full code    FOR FOLLOW UP:  [ ] f/u cortisol levels for stress test  [ ] BAL flow cytometry   [ ]f/u Cx's  [ ]f/u BAL flow cytometry    [ ] vanc trough with pm dose on 7/1.

## 2017-06-30 NOTE — PROGRESS NOTE ADULT - PROBLEM SELECTOR PLAN 1
- c/w atrovent   - c/w vanc/zosyn for empiric coverage  - BCx negative  - clarify VATS procedure  - continue to monitor WBC -> trending down

## 2017-06-30 NOTE — PROGRESS NOTE ADULT - SUBJECTIVE AND OBJECTIVE BOX
PULMONARY PROGRESS NOTE    DUBIN, SHELLY  MRN-[id number]    Patient is a 76y old  Female who presents with a chief complaint of hypotension hypoxia s/p bronchoscopy (28 Jun 2017 14:44)      HPI:  -breathing much more comfortably today,  occasional cough      ACTIVE MEDICATION LIST:  MEDICATIONS  (STANDING):  aspirin  chewable 81 milliGRAM(s) Oral daily  gabapentin 300 milliGRAM(s) Oral three times a day  atorvastatin 40 milliGRAM(s) Oral at bedtime  piperacillin/tazobactam IVPB. 3.375 Gram(s) IV Intermittent every 8 hours  vancomycin  IVPB 1000 milliGRAM(s) IV Intermittent every 12 hours  heparin  Injectable 5000 Unit(s) SubCutaneous every 8 hours  metoprolol 12.5 milliGRAM(s) Oral two times a day  sodium chloride 0.9%. 1000 milliLiter(s) (75 mL/Hr) IV Continuous <Continuous>  senna 2 Tablet(s) Oral at bedtime    MEDICATIONS  (PRN):  ipratropium    for Nebulization 500 MICROGram(s) Inhalation every 6 hours PRN Shortness of Breath and/or Wheezing      EXAM:  Vital Signs Last 24 Hrs  T(C): 36.7 (30 Jun 2017 08:00), Max: 37.1 (30 Jun 2017 00:00)  T(F): 98 (30 Jun 2017 08:00), Max: 98.8 (30 Jun 2017 00:00)  HR: 86 (30 Jun 2017 11:00) (73 - 126)  BP: 99/51 (30 Jun 2017 11:00) (77/43 - 126/59)  BP(mean): 73 (30 Jun 2017 11:00) (56 - 85)  RR: 31 (30 Jun 2017 11:00) (21 - 59)  SpO2: 98% (30 Jun 2017 11:00) (85% - 100%)    GENERAL: The patient is awake and alert in no apparent distress.     LUNGS: Clear to auscultation without wheezing, rales or rhonchi; respirations unlabored    HEART: Regular rate and rhythm without murmur.    LABS/IMAGING: reviewed      PROBLEM LIST:  76y Female with HEALTH ISSUES - PROBLEM Dx:  Pneumonia: Pneumonia  Hypoxia: Hypoxia    RECS:  -Cyto and path from bronch with eosinophils, possible eosin. pna., patient improving on antibiotics, would hold off on steroids for now and cont abx.  if worsens will likely put on steroids.  -supplemental O2, wean as tolerated  -AMBER Nicholson MD   [phone]

## 2017-06-30 NOTE — PROGRESS NOTE ADULT - SUBJECTIVE AND OBJECTIVE BOX
MICU Transfer Note    Transfer from: MICU    Transfer to: (x) Medicine    (  ) Telemetry     (   ) RCU        (    ) Palliative         (   ) Stroke Unit          (   ) __________________    Accepting Physician: Dr. Farmer, hospitalist.   Signout given to:     MICU COURSE:     76yoF hx of CAD, CABG (2011) a/w hypoxia with desat to 80s and hypotension requiring pressors during bronchoscopy. Beginning of June, patient began experiencing cough and SOB and visited PCP, who treated her with Azithromycin and Levofloxacin without resolution. She underwent elective bronchoscopy for a workup of noninfectious causes, where she desaturated to 80s and became hypotensive with systolic in the 80s requiring 1L NS, clif infusion, and nonrebreather. Repeat CXR showed bilateral infiltrates with a peripheral pattern in the context of elevated white count at 19. CT Chest on the 22nd showed extensive consolidations with air bronchograms. Bronchiole lavage have shown rare gram positive cocci in pairs on gram stain and bronchial cultures have grown normal raj.  Moving bilateral infiltrates in context of eosinophilia, lack of improvement on Abx, lack of fever, dry cough suggests noninfectious causes of pna, such as eosinophilic vs. cryptogenic pna. The treatment for these noninfectious pneumonias would be steroids, but the plan is to hold off on treatment until diagnosis is confirmed with biopsy during VATs. Meanwhile, patient has been treated empirically with Vanc and Zosyn. As workup for etiology of hypotension, cortisol levels before and after stress dose of cosyntropin - results are still pending. On second night of admission, patient weaned off phenylephrine with blood pressure remaining at her usual baseline BPs (per patient, systolic 90s to low 100s and diastolic at 60-70 at baseline) and saturating well on low flow NC. Patient has been hemodynamically stable and will be transferred to medical floors under Dr. Farmer. MICU course has been complicated by tachycardia, CP with ST changes in V5-V6 s/p terbutaline injection at PIV infiltrate site. Tachycardia and symptoms resolved s/p metoprolol treatment. Repeat cardiac enzymes have been negative.     ASSESSMENT & PLAN:     76yoF hx CAD/CAGB p/w hypotension requiring pressor support and hypoxia s/p bronchoscopy - both now resolved. Patient has bilateral infiltrates in context of mild eosinophilia, lack of improvement in antibiotics, and lack of fever - likely noninfectious pna, such as eosinophilic and cryptofenic pna.     # Neuro: awake, alert, off sedation.   -No acute issues.    # Cardiac:   Questionable changes on admission EKG, but patient without chest pain and cardiac enzymes negative x2. Patient with episode of tachy, CP, ST changes on EKG s/p terbutaline injection - resolved after metoprolol treatment. HDS off pressors since last night. Hyperdynamic LV on POCUS, indicating some fluid depletion.   -c/w ASA, statin  -Initially requiring phenylephrine for pressor support, now successfully titrated off pressors  -NS@75    #Respiratory:  Patient presented for planned bronch for evaluation of persistent cough, dyspnea, peripheral eosinophilia and persistent infiltrates on chest imaging  Patient hypoxic during bronchoscopy, initially requiring NRB for support, now doing well on O2NC.  -C/w duoneb PRN, atrovent treatment.   -Wean O2 NC as tolerated  -C/w vancomycin and zosyn for now for empiric coverage  -F/u BCx, bronchial cultures  -F/u pulmonology recs Dr Munson, patient may need VATS procedure if diagnosis remains unclear     ID:   Patient presented for planned bronch for evaluation of persistent cough, dyspnea, peripheral eosinophilia and persistent infiltrates on chest imaging  Bronch showing rare GPC in pairs, 380 nucleated cells, only 7% eosinophils, less c/w eosinophilic PNA. BCx NGTD, BAL Cx shows normal raj.   -C/w vancomycin and zosyn for now for empiric coverage    Renal: Stable, Cr at baseline  - no acute issues.     GI: Stable, no acute issues.   -c/w DASH diet    Endocrine: no h/o diabetes, no need for HISS    FEN: on DASH diet  DVT PPX: HSQ  Dispo: full code    FOR FOLLOW UP:  [ ] f/u cortisol levels for stress test  [ ] BAL flow cytometry   [ ]f/u Cx's  [ ]f/u BAL flow cytometry    [ ] vanc trough with pm dose on 7/1. MICU Transfer Note    Transfer from: MICU    Transfer to: (x) Medicine    (  ) Telemetry     (   ) RCU        (    ) Palliative         (   ) Stroke Unit          (   ) __________________    Accepting Physician: Dr. Farmer, hospitalist.   Signout given to:     MICU COURSE:     76yoF hx of CAD, CABG (2011) a/w hypoxia with desat to 80s and hypotension requiring pressors during bronchoscopy. Beginning of June, patient began experiencing cough and SOB and visited PCP, who treated her with Azithromycin and Levofloxacin without resolution. She underwent elective bronchoscopy for a workup of noninfectious causes, where she desaturated to 80s and became hypotensive with systolic in the 80s requiring 1L NS, clif infusion, and nonrebreather. Repeat CXR showed bilateral infiltrates with a peripheral pattern in the context of elevated white count at 19. CT Chest on the 22nd showed extensive consolidations with air bronchograms. Bronchiole lavage have shown rare gram positive cocci in pairs on gram stain and bronchial cultures have grown normal raj.  Moving bilateral infiltrates in context of eosinophilia, lack of improvement on Abx, lack of fever, dry cough suggests noninfectious causes of pna, such as eosinophilic vs. cryptogenic pna. The treatment for these noninfectious pneumonias would be steroids, but the plan is to hold off on treatment until diagnosis is confirmed with biopsy during VATs. Meanwhile, patient has been treated empirically with Vanc and Zosyn. As workup for etiology of hypotension, cortisol levels before and after stress dose of cosyntropin - results are still pending. On second night of admission, patient weaned off phenylephrine with blood pressure remaining at her usual baseline BPs (per patient, systolic 90s to low 100s and diastolic at 60-70 at baseline) and saturating well on low flow NC. Patient has been hemodynamically stable and will be transferred to medical floors under Dr. Farmer. MICU course has been complicated by tachycardia, CP with ST changes in V5-V6 s/p terbutaline injection at PIV infiltrate site. Tachycardia and symptoms resolved s/p metoprolol treatment. Repeat cardiac enzymes have been negative.     ASSESSMENT & PLAN:     76yoF hx CAD/CAGB p/w hypotension requiring pressor support and hypoxia s/p bronchoscopy - both now resolved. Patient has bilateral infiltrates in context of mild eosinophilia, lack of improvement in antibiotics, and lack of fever - likely noninfectious pna, such as eosinophilic and cryptofenic pna.     # Neuro: awake, alert, off sedation.   -No acute issues.    # Cardiac:   Questionable changes on admission EKG, but patient without chest pain and cardiac enzymes negative x2. Patient with episode of tachy, CP, ST changes on EKG s/p terbutaline injection - resolved after metoprolol treatment. HDS off pressors since last night. Hyperdynamic LV on POCUS, indicating some fluid depletion.   -Appreciate cardiology recs. Restart on half of home dose of metoprolol (12.5mg BID).  -c/w ASA, statin  -Initially requiring phenylephrine for pressor support, now successfully titrated off pressors  -NS@75    #Respiratory:  Patient presented for planned bronch for evaluation of persistent cough, dyspnea, peripheral eosinophilia and persistent infiltrates on chest imaging  Patient hypoxic during bronchoscopy, initially requiring NRB for support, now doing well on O2NC.  -C/w duoneb PRN, atrovent treatment.   -Wean O2 NC as tolerated  -C/w vancomycin and zosyn for now for empiric coverage  -F/u BCx, bronchial cultures  -F/u pulmonology recs Dr Munson, patient may need VATS procedure if diagnosis remains unclear     ID:   Patient presented for planned bronch for evaluation of persistent cough, dyspnea, peripheral eosinophilia and persistent infiltrates on chest imaging  Bronch showing rare GPC in pairs, 380 nucleated cells, only 7% eosinophils, less c/w eosinophilic PNA. BCx NGTD, BAL Cx shows normal raj.   -C/w vancomycin and zosyn for now for empiric coverage    Renal: Stable, Cr at baseline  - no acute issues.     GI: Stable, no acute issues.   -c/w DASH diet    Endocrine: no h/o diabetes, no need for HISS    FEN: on DASH diet  DVT PPX: HSQ  Dispo: full code    FOR FOLLOW UP:  [ ] f/u cortisol levels for stress test  [ ] BAL flow cytometry   [ ]f/u Cx's  [ ]f/u BAL flow cytometry    [ ] vanc trough with pm dose on 7/1.

## 2017-06-30 NOTE — PROGRESS NOTE ADULT - ASSESSMENT
76F - h.o. GERD, FXI deficiency, CAD s/p CABG 2010 - initially presented with SOB requiring ICU care for oxygenation/ventilation - cardiology c/f immediate tachycardia, CP, STDs, hemodynamic instability that resolved.  Although differential in most circumstances includes PE; in this case, the fact that the symptoms, examiniation findings, and diagnostic testing all changed immediately after terbutaline - would attribute to latter and only pursue workup for other causes if status changes.    Problems:  1. CP  2. STDs  3. Hemodynamic Instability  4. Tachycardia  5. H/O CAD    Recs  - Continue Metoprolol 12.5 BID  - Would recommend to GIVE IVF - Has a hyperdynamic LV with LVOT obstruction due to fluid loss  - No need to trend further enzymes  - Would continue ASA/Atorva  - Workup other causes only if suspicion remains  - Off for weekend. Please call [phone] with any questions    Thank you for this consult    Domenico Aj    Please call me [phone] if questions            45 minutes spent in coordination with medical ICU team. > 50% time spent on discussion of hemodynamics, interpretation, plan, arranging stat echo, and counseling/discussion with family for critically ill patient. .

## 2017-06-30 NOTE — PROGRESS NOTE ADULT - SUBJECTIVE AND OBJECTIVE BOX
CHIEF COMPLAINT/REASON FOR CONSULT:  CP, ST Depressions, Hemodynamic Instability following Terbutaline    HISTORY OF PRESENT ILLNESS:  76F - h.o. GERD, FXI deficiency, CAD s/p CABG  - initially presented with SOB requiring ICU care for oxygenation/ventilation - cardiology c/f immediate tachycardia, CP, STDs, hemodynamic .  Patient was giving Terbutaline and developed immediate tachycardia, tremulousness, and <1mm lateral STDs. she had come off pressor support earlier in tthe day.   Patient reports a normal stress in 2016.  --------------------  - Resolution of CP  - Hyperdynamic LV with negative cardiac enzymes  - LVOT gradient in setting of latter      Allergies    codeine (Nausea)    Intolerances    	    MEDICATIONS:  MEDICATIONS  (STANDING):  aspirin  chewable 81 milliGRAM(s) Oral daily  gabapentin 300 milliGRAM(s) Oral three times a day  atorvastatin 40 milliGRAM(s) Oral at bedtime  piperacillin/tazobactam IVPB. 3.375 Gram(s) IV Intermittent every 8 hours  vancomycin  IVPB 1000 milliGRAM(s) IV Intermittent every 12 hours  heparin  Injectable 5000 Unit(s) SubCutaneous every 8 hours  metoprolol 12.5 milliGRAM(s) Oral two times a day  sodium chloride 0.9%. 1000 milliLiter(s) (75 mL/Hr) IV Continuous <Continuous>  senna 2 Tablet(s) Oral at bedtime      PAST MEDICAL & SURGICAL HISTORY:  GERD (gastroesophageal reflux disease)  Chest pain in adult: BYPASS SURGERY  History of appendectomy  History of cataract surgery, right      FAMILY HISTORY:      SOCIAL HISTORY:    [x ] Non-smoker  [ ] Smoker  [ ] No Alcohol      REVIEW OF SYSTEMS:  Gen: Anxious; fatigued  Neuro: No HAs/N/V  Endocrine: +Sweating, no pruritis  Skin: Swelling at site of injection; no erythema   Resp: Improved SOB; + Cough  CV: + CP +Palpitations  Abd: No N/V/no Abd Pain  Infectious: No recent travel; no sick contacts  Heme: +h.o. FXI deficiency; no prupura  MS: no joint pain; no body aches    PHYSICAL EXAM:  T(C): 36.2 (17 @ 04:00), Max: 37.1 (17 @ 00:00)  HR: 76 (17 @ 07:00) (70 - 126)  BP: 126/59 (17 @ 07:00) (74/40 - 126/59)  RR: 25 (17 @ 07:00) (21 - 59)  SpO2: 96% (17 @ 07:00) (85% - 100%)  Wt(kg): --  Daily     Daily Weight in k.2 (2017 04:00)  I&O's Summary    2017 07:  -  2017 07:00  --------------------------------------------------------  IN: 1112.1 mL / OUT: 0 mL / NET: 1112.1 mL    2017 07:01  -  2017 08:08  --------------------------------------------------------  IN: 75 mL / OUT: 0 mL / NET: 75 mL          Appearance: Les anxious  HEENT:   Normal oral mucosa, PERRL, EOMI	  Lymphatic: No lymphadenopathy  Cardiovascular: Normal S1 S2, +Tachycardia No JVD, No murmurs, No edema  Respiratory: Coarse BS	  Psychiatry: A & O x 3, Mood & affect appropriate  Gastrointestinal:  Soft, Non-tender, + BS	  Skin: No rashes, No ecchymoses, No cyanosis	  Neurologic: Non-focal  Extremities: Normal range of motion, No clubbing, cyanosis or edema  Vascular: Peripheral pulses palpable 2+ bilaterally        LABS:	 	    CBC Full  -  ( 2017 03:35 )  WBC Count : 20.8 K/uL  Hemoglobin : 11.0 g/dL  Hematocrit : 34.5 %  Platelet Count - Automated : 460 K/uL  Mean Cell Volume : 91.6 fl  Mean Cell Hemoglobin : 29.3 pg  Mean Cell Hemoglobin Concentration : 31.9 gm/dL  Auto Neutrophil # : 16.9 K/uL  Auto Lymphocyte # : 2.4 K/uL  Auto Monocyte # : 1.4 K/uL  Auto Eosinophil # : 0.0 K/uL  Auto Basophil # : 0.1 K/uL  Auto Neutrophil % : 81.0 %  Auto Lymphocyte % : 11.7 %  Auto Monocyte % : 6.8 %  Auto Eosinophil % : 0.2 %  Auto Basophil % : 0.3 %        140  |  99  |  10  ----------------------------<  132<H>  3.7   |  27  |  0.91      139  |  107  |  25<H>  ----------------------------<  134<H>  4.4   |  19<L>  |  0.69    Ca    9.8      2017 11:18  Ca    8.3<L>      2017 12:46  Phos  2.8       Phos  2.9       Mg     2.2       Mg     2.0         TPro  7.4  /  Alb  4.1  /  TBili  0.8  /  DBili  x   /  AST  20  /  ALT  19  /  AlkPhos  96    TPro  6.4  /  Alb  2.9<L>  /  TBili  0.3  /  DBili  x   /  AST  21  /  ALT  23  /  AlkPhos  78              CARDIAC MARKERS:            TELEMETRY: 	Sinus tachycardia    ECG:  	Sinus tachycardia with resolving maximum amplitude < 1mm STDs  RADIOLOGY:  < from: Xray Chest 1 View AP- PORTABLE-Urgent (17 @ 16:28) >  Impression:    The heart is normal in size. Mild elevation right hemidiaphragm. Diffuse   airspace disease is present compatible with multifocal pneumonia and   superimposed pulmonary edema. This appears to be improving when compared   to previous study done 2017 at 10:42 AM. Status post sternotomy.                    SADIA MAO M.D., ATTENDING RADIOLOGIST  This document has been electronically signed. 2017  8:39AM              < end of copied text >    Prelim Echo: Demonstrates hyperdynamic ventricle and collapsing IVC; no WMAs

## 2017-07-01 DIAGNOSIS — Z29.9 ENCOUNTER FOR PROPHYLACTIC MEASURES, UNSPECIFIED: ICD-10-CM

## 2017-07-01 LAB
ALBUMIN SERPL ELPH-MCNC: 2.6 G/DL — LOW (ref 3.3–5)
ALP SERPL-CCNC: 81 U/L — SIGNIFICANT CHANGE UP (ref 40–120)
ALT FLD-CCNC: 28 U/L RC — SIGNIFICANT CHANGE UP (ref 10–45)
ANION GAP SERPL CALC-SCNC: 13 MMOL/L — SIGNIFICANT CHANGE UP (ref 5–17)
APTT BLD: 45.2 SEC — HIGH (ref 27.5–37.4)
AST SERPL-CCNC: 30 U/L — SIGNIFICANT CHANGE UP (ref 10–40)
BASOPHILS # BLD AUTO: 0.1 K/UL — SIGNIFICANT CHANGE UP (ref 0–0.2)
BASOPHILS NFR BLD AUTO: 0.5 % — SIGNIFICANT CHANGE UP (ref 0–2)
BILIRUB SERPL-MCNC: 0.3 MG/DL — SIGNIFICANT CHANGE UP (ref 0.2–1.2)
BUN SERPL-MCNC: 17 MG/DL — SIGNIFICANT CHANGE UP (ref 7–23)
CALCIUM SERPL-MCNC: 8.9 MG/DL — SIGNIFICANT CHANGE UP (ref 8.4–10.5)
CHLORIDE SERPL-SCNC: 104 MMOL/L — SIGNIFICANT CHANGE UP (ref 96–108)
CO2 SERPL-SCNC: 23 MMOL/L — SIGNIFICANT CHANGE UP (ref 22–31)
CREAT SERPL-MCNC: 0.8 MG/DL — SIGNIFICANT CHANGE UP (ref 0.5–1.3)
EOSINOPHIL # BLD AUTO: 1.4 K/UL — HIGH (ref 0–0.5)
EOSINOPHIL NFR BLD AUTO: 9.1 % — HIGH (ref 0–6)
GLUCOSE SERPL-MCNC: 98 MG/DL — SIGNIFICANT CHANGE UP (ref 70–99)
HCT VFR BLD CALC: 30.4 % — LOW (ref 34.5–45)
HGB BLD-MCNC: 10.4 G/DL — LOW (ref 11.5–15.5)
INR BLD: 1.52 RATIO — HIGH (ref 0.88–1.16)
LYMPHOCYTES # BLD AUTO: 18.1 % — SIGNIFICANT CHANGE UP (ref 13–44)
LYMPHOCYTES # BLD AUTO: 2.8 K/UL — SIGNIFICANT CHANGE UP (ref 1–3.3)
MAGNESIUM SERPL-MCNC: 1.9 MG/DL — SIGNIFICANT CHANGE UP (ref 1.6–2.6)
MCHC RBC-ENTMCNC: 31.2 PG — SIGNIFICANT CHANGE UP (ref 27–34)
MCHC RBC-ENTMCNC: 34.2 GM/DL — SIGNIFICANT CHANGE UP (ref 32–36)
MCV RBC AUTO: 91.1 FL — SIGNIFICANT CHANGE UP (ref 80–100)
MONOCYTES # BLD AUTO: 1.3 K/UL — HIGH (ref 0–0.9)
MONOCYTES NFR BLD AUTO: 8.2 % — SIGNIFICANT CHANGE UP (ref 2–14)
NEUTROPHILS # BLD AUTO: 9.8 K/UL — HIGH (ref 1.8–7.4)
NEUTROPHILS NFR BLD AUTO: 64 % — SIGNIFICANT CHANGE UP (ref 43–77)
PHOSPHATE SERPL-MCNC: 4 MG/DL — SIGNIFICANT CHANGE UP (ref 2.5–4.5)
PLATELET # BLD AUTO: 377 K/UL — SIGNIFICANT CHANGE UP (ref 150–400)
POTASSIUM SERPL-MCNC: 4 MMOL/L — SIGNIFICANT CHANGE UP (ref 3.5–5.3)
POTASSIUM SERPL-SCNC: 4 MMOL/L — SIGNIFICANT CHANGE UP (ref 3.5–5.3)
PROT SERPL-MCNC: 6.1 G/DL — SIGNIFICANT CHANGE UP (ref 6–8.3)
PROTHROM AB SERPL-ACNC: 16.7 SEC — HIGH (ref 9.8–12.7)
RBC # BLD: 3.34 M/UL — LOW (ref 3.8–5.2)
RBC # FLD: 13.2 % — SIGNIFICANT CHANGE UP (ref 10.3–14.5)
SODIUM SERPL-SCNC: 140 MMOL/L — SIGNIFICANT CHANGE UP (ref 135–145)
WBC # BLD: 15.2 K/UL — HIGH (ref 3.8–10.5)
WBC # FLD AUTO: 15.2 K/UL — HIGH (ref 3.8–10.5)

## 2017-07-01 PROCEDURE — 99233 SBSQ HOSP IP/OBS HIGH 50: CPT | Mod: GC

## 2017-07-01 RX ORDER — DOCUSATE SODIUM 100 MG
100 CAPSULE ORAL THREE TIMES A DAY
Qty: 0 | Refills: 0 | Status: DISCONTINUED | OUTPATIENT
Start: 2017-07-01 | End: 2017-07-03

## 2017-07-01 RX ADMIN — Medication 250 MILLIGRAM(S): at 06:11

## 2017-07-01 RX ADMIN — ATORVASTATIN CALCIUM 40 MILLIGRAM(S): 80 TABLET, FILM COATED ORAL at 21:38

## 2017-07-01 RX ADMIN — PIPERACILLIN AND TAZOBACTAM 25 GRAM(S): 4; .5 INJECTION, POWDER, LYOPHILIZED, FOR SOLUTION INTRAVENOUS at 13:48

## 2017-07-01 RX ADMIN — HEPARIN SODIUM 5000 UNIT(S): 5000 INJECTION INTRAVENOUS; SUBCUTANEOUS at 13:48

## 2017-07-01 RX ADMIN — GABAPENTIN 300 MILLIGRAM(S): 400 CAPSULE ORAL at 21:38

## 2017-07-01 RX ADMIN — GABAPENTIN 300 MILLIGRAM(S): 400 CAPSULE ORAL at 06:11

## 2017-07-01 RX ADMIN — HEPARIN SODIUM 5000 UNIT(S): 5000 INJECTION INTRAVENOUS; SUBCUTANEOUS at 06:11

## 2017-07-01 RX ADMIN — PIPERACILLIN AND TAZOBACTAM 25 GRAM(S): 4; .5 INJECTION, POWDER, LYOPHILIZED, FOR SOLUTION INTRAVENOUS at 21:39

## 2017-07-01 RX ADMIN — Medication 100 MILLIGRAM(S): at 19:36

## 2017-07-01 RX ADMIN — GABAPENTIN 300 MILLIGRAM(S): 400 CAPSULE ORAL at 13:48

## 2017-07-01 RX ADMIN — PIPERACILLIN AND TAZOBACTAM 25 GRAM(S): 4; .5 INJECTION, POWDER, LYOPHILIZED, FOR SOLUTION INTRAVENOUS at 06:11

## 2017-07-01 RX ADMIN — HEPARIN SODIUM 5000 UNIT(S): 5000 INJECTION INTRAVENOUS; SUBCUTANEOUS at 21:39

## 2017-07-01 RX ADMIN — Medication 250 MILLIGRAM(S): at 17:19

## 2017-07-01 RX ADMIN — Medication 81 MILLIGRAM(S): at 13:48

## 2017-07-01 NOTE — PROGRESS NOTE ADULT - SUBJECTIVE AND OBJECTIVE BOX
Patient is a 76y old  Female who presents with a chief complaint of hypotension hypoxia s/p bronchoscopy (28 Jun 2017 14:44)      SUBJECTIVE / OVERNIGHT EVENTS: no new complaints. breathing feels better. no fever or chills    MEDICATIONS  (STANDING):  aspirin  chewable 81 milliGRAM(s) Oral daily  gabapentin 300 milliGRAM(s) Oral three times a day  atorvastatin 40 milliGRAM(s) Oral at bedtime  piperacillin/tazobactam IVPB. 3.375 Gram(s) IV Intermittent every 8 hours  vancomycin  IVPB 1000 milliGRAM(s) IV Intermittent every 12 hours  heparin  Injectable 5000 Unit(s) SubCutaneous every 8 hours  metoprolol 12.5 milliGRAM(s) Oral two times a day  sodium chloride 0.9%. 1000 milliLiter(s) (75 mL/Hr) IV Continuous <Continuous>  senna 2 Tablet(s) Oral at bedtime    MEDICATIONS  (PRN):  ipratropium    for Nebulization 500 MICROGram(s) Inhalation every 6 hours PRN Shortness of Breath and/or Wheezing      Vital Signs Last 24 Hrs  T(C): 36.7 (07-01-17 @ 12:33), Max: 36.8 (06-30-17 @ 21:35)  HR: 81 (07-01-17 @ 12:33) (78 - 96)  BP: 94/57 (07-01-17 @ 12:33) (94/57 - 120/74)  RR: 18 (07-01-17 @ 12:33) (18 - 28)  SpO2: 95% (07-01-17 @ 12:33) (95% - 98%)  CAPILLARY BLOOD GLUCOSE        I&O's Summary    30 Jun 2017 07:01  -  01 Jul 2017 07:00  --------------------------------------------------------  IN: 1975 mL / OUT: 0 mL / NET: 1975 mL        PHYSICAL EXAM:  GENERAL: NAD, well-developed  EYES: EOMI, PERRLA, conjunctiva and sclera clear  NECK: Supple, No JVD  CHEST/LUNG: Clear to auscultation bilaterally; No wheeze  HEART: Regular rate and rhythm; No murmurs, rubs, or gallops  ABDOMEN: Soft, Nontender, Nondistended; Bowel sounds present  EXTREMITIES:  2+ Peripheral Pulses, No clubbing, cyanosis, or edema  PSYCH: AAOx3  NEUROLOGY: non-focal  SKIN: No rashes or lesions    LABS:                        10.4   15.2  )-----------( 377      ( 01 Jul 2017 07:19 )             30.4     07-01    140  |  104  |  17  ----------------------------<  98  4.0   |  23  |  0.80    Ca    8.9      01 Jul 2017 07:19  Phos  4.0     07-01  Mg     1.9     07-01    TPro  6.1  /  Alb  2.6<L>  /  TBili  0.3  /  DBili  x   /  AST  30  /  ALT  28  /  AlkPhos  81  07-01    PT/INR - ( 01 Jul 2017 07:19 )   PT: 16.7 sec;   INR: 1.52 ratio         PTT - ( 01 Jul 2017 07:19 )  PTT:45.2 sec          RADIOLOGY & ADDITIONAL TESTS:    Imaging Personally Reviewed:    Consultant(s) Notes Reviewed:  Pulmology--> c/w emperic abx, hold steroids for now    Care Discussed with Consultants/Other Providers:

## 2017-07-01 NOTE — PROGRESS NOTE ADULT - SUBJECTIVE AND OBJECTIVE BOX
Overnight Events:    Pt seen and examined at bedside. No events overnight, patient states she is doing a lot better, she has WOB or SOB and is coughing less. She is in no pain, has a good appetite and is sleeping very well.     VITAL SIGNS:    Vital Signs Last 24 Hrs  T(C): 36.7 (01 Jul 2017 18:25), Max: 36.8 (30 Jun 2017 21:35)  T(F): 98 (01 Jul 2017 18:25), Max: 98.2 (30 Jun 2017 21:35)  HR: 89 (01 Jul 2017 18:25) (78 - 96)  BP: 99/67 (01 Jul 2017 18:25) (94/57 - 120/74)  BP(mean): --  RR: 17 (01 Jul 2017 18:25) (17 - 28)  SpO2: 95% (01 Jul 2017 18:25) (95% - 98%)    I&O's Summary    30 Jun 2017 07:01  -  01 Jul 2017 07:00  --------------------------------------------------------  IN: 1975 mL / OUT: 0 mL / NET: 1975 mL          PHYSICAL EXAM:     GENERAL: no acute distress  HEENT: NC/AT, EOMI, neck supple, MMM  RESPIRATORY :diminished breath sounds in bases,   CARDIOVASCULAR: RRR, no murmurs, gallops, rubs  ABDOMINAL: soft, non-tender, non-distended, positive bowel sounds   EXTREMITIES: no clubbing, cyanosis, or edema  NEUROLOGICAL: alert and oriented x 3, non-focal  SKIN: no rashes or lesions   MUSCULOSKELETAL: no gross joint deformity                          10.4   15.2  )-----------( 377      ( 01 Jul 2017 07:19 )             30.4     07-01    140  |  104  |  17  ----------------------------<  98  4.0   |  23  |  0.80    Ca    8.9      01 Jul 2017 07:19  Phos  4.0     07-01  Mg     1.9     07-01    TPro  6.1  /  Alb  2.6<L>  /  TBili  0.3  /  DBili  x   /  AST  30  /  ALT  28  /  AlkPhos  81  07-01      CAPILLARY BLOOD GLUCOSE          MEDICATIONS  (STANDING):  aspirin  chewable 81 milliGRAM(s) Oral daily  gabapentin 300 milliGRAM(s) Oral three times a day  atorvastatin 40 milliGRAM(s) Oral at bedtime  piperacillin/tazobactam IVPB. 3.375 Gram(s) IV Intermittent every 8 hours  vancomycin  IVPB 1000 milliGRAM(s) IV Intermittent every 12 hours  heparin  Injectable 5000 Unit(s) SubCutaneous every 8 hours  metoprolol 12.5 milliGRAM(s) Oral two times a day  sodium chloride 0.9%. 1000 milliLiter(s) (75 mL/Hr) IV Continuous <Continuous>  senna 2 Tablet(s) Oral at bedtime      Allergies    codeine (Nausea)    Intolerances        Radiology

## 2017-07-01 NOTE — PROGRESS NOTE ADULT - PROBLEM SELECTOR PLAN 1
- c/w atrovent   - c/w vanc/zosyn for empiric coverage  - BCx negative  - per pt, Dr. Cardona reluctant to do a VATS procedure  - continue to monitor WBC stable (13.8->15.1)

## 2017-07-01 NOTE — PROGRESS NOTE ADULT - SUBJECTIVE AND OBJECTIVE BOX
PULMONARY PROGRESS NOTE    DUBIN, SHELLY  MRN-[id number]    Patient is a 76y old  Female who presents with a chief complaint of hypotension hypoxia s/p bronchoscopy (28 Jun 2017 14:44)      HPI:  -breathing is comfortable occasional brown sputum    ROS:  -no fever    MEDICATIONS  (STANDING):  aspirin  chewable 81 milliGRAM(s) Oral daily  gabapentin 300 milliGRAM(s) Oral three times a day  atorvastatin 40 milliGRAM(s) Oral at bedtime  piperacillin/tazobactam IVPB. 3.375 Gram(s) IV Intermittent every 8 hours  vancomycin  IVPB 1000 milliGRAM(s) IV Intermittent every 12 hours  heparin  Injectable 5000 Unit(s) SubCutaneous every 8 hours  metoprolol 12.5 milliGRAM(s) Oral two times a day  sodium chloride 0.9%. 1000 milliLiter(s) (75 mL/Hr) IV Continuous <Continuous>  senna 2 Tablet(s) Oral at bedtime    MEDICATIONS  (PRN):  ipratropium    for Nebulization 500 MICROGram(s) Inhalation every 6 hours PRN Shortness of Breath and/or Wheezing    Vital Signs Last 24 Hrs  T(C): 36.8 (01 Jul 2017 04:51), Max: 37 (30 Jun 2017 16:00)  T(F): 98.2 (01 Jul 2017 04:51), Max: 98.6 (30 Jun 2017 16:00)  HR: 96 (01 Jul 2017 09:30) (78 - 96)  BP: 97/66 (01 Jul 2017 09:30) (89/54 - 120/74)  BP(mean): 69 (30 Jun 2017 16:00) (64 - 75)  RR: 20 (01 Jul 2017 04:51) (20 - 39)  SpO2: 98% (01 Jul 2017 04:51) (90% - 100%)    GENERAL: The patient is awake and alert in no apparent distress.     LUNGS: Clear to auscultation without wheezing, rales or rhonchi; respirations unlabored    HEART: Regular rate and rhythm without murmur.    LABS/IMAGING: reviewed      PROBLEM LIST:  76y Female with HEALTH ISSUES - PROBLEM Dx:  Pneumonia: Pneumonia  Hypoxia: Hypoxia    RECS:  -Cyto and path from bronch with eosinophils, possible eosin. pna., patient improving on antibiotics, would hold off on steroids for now and cont abx.  if worsens will likely put on steroids.  -supplemental O2, wean as tolerated  -PRN duonebs      Valerie Daglian, MD   [phone]

## 2017-07-01 NOTE — PROGRESS NOTE ADULT - PROBLEM SELECTOR PLAN 1
- c/w atrovent   - c/w vanc/zosyn for empiric coverage. Vanco level in therapeutic range. continue to monitor levels  - BCx negative  - per pulm. Cyto and path from bronch with eosinophils, possible eosin. pna.. 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  - pulm f/u appreciated

## 2017-07-02 DIAGNOSIS — I25.10 ATHEROSCLEROTIC HEART DISEASE OF NATIVE CORONARY ARTERY WITHOUT ANGINA PECTORIS: ICD-10-CM

## 2017-07-02 LAB
ANION GAP SERPL CALC-SCNC: 11 MMOL/L — SIGNIFICANT CHANGE UP (ref 5–17)
BUN SERPL-MCNC: 17 MG/DL — SIGNIFICANT CHANGE UP (ref 7–23)
CALCIUM SERPL-MCNC: 9 MG/DL — SIGNIFICANT CHANGE UP (ref 8.4–10.5)
CHLORIDE SERPL-SCNC: 107 MMOL/L — SIGNIFICANT CHANGE UP (ref 96–108)
CO2 SERPL-SCNC: 24 MMOL/L — SIGNIFICANT CHANGE UP (ref 22–31)
CREAT SERPL-MCNC: 0.77 MG/DL — SIGNIFICANT CHANGE UP (ref 0.5–1.3)
GLUCOSE SERPL-MCNC: 112 MG/DL — HIGH (ref 70–99)
HCT VFR BLD CALC: 32.9 % — LOW (ref 34.5–45)
HGB BLD-MCNC: 11 G/DL — LOW (ref 11.5–15.5)
MCHC RBC-ENTMCNC: 30.2 PG — SIGNIFICANT CHANGE UP (ref 27–34)
MCHC RBC-ENTMCNC: 33.5 GM/DL — SIGNIFICANT CHANGE UP (ref 32–36)
MCV RBC AUTO: 90.1 FL — SIGNIFICANT CHANGE UP (ref 80–100)
PLATELET # BLD AUTO: 375 K/UL — SIGNIFICANT CHANGE UP (ref 150–400)
POTASSIUM SERPL-MCNC: 3.7 MMOL/L — SIGNIFICANT CHANGE UP (ref 3.5–5.3)
POTASSIUM SERPL-SCNC: 3.7 MMOL/L — SIGNIFICANT CHANGE UP (ref 3.5–5.3)
RBC # BLD: 3.65 M/UL — LOW (ref 3.8–5.2)
RBC # FLD: 13.5 % — SIGNIFICANT CHANGE UP (ref 10.3–14.5)
SODIUM SERPL-SCNC: 142 MMOL/L — SIGNIFICANT CHANGE UP (ref 135–145)
VANCOMYCIN FLD-MCNC: 14.1 UG/ML — SIGNIFICANT CHANGE UP
WBC # BLD: 14.5 K/UL — HIGH (ref 3.8–10.5)
WBC # FLD AUTO: 14.5 K/UL — HIGH (ref 3.8–10.5)

## 2017-07-02 PROCEDURE — 99233 SBSQ HOSP IP/OBS HIGH 50: CPT | Mod: GC

## 2017-07-02 RX ORDER — VANCOMYCIN HCL 1 G
1250 VIAL (EA) INTRAVENOUS EVERY 12 HOURS
Qty: 0 | Refills: 0 | Status: DISCONTINUED | OUTPATIENT
Start: 2017-07-02 | End: 2017-07-03

## 2017-07-02 RX ADMIN — Medication 100 MILLIGRAM(S): at 05:16

## 2017-07-02 RX ADMIN — Medication 81 MILLIGRAM(S): at 13:09

## 2017-07-02 RX ADMIN — GABAPENTIN 300 MILLIGRAM(S): 400 CAPSULE ORAL at 21:40

## 2017-07-02 RX ADMIN — Medication 250 MILLIGRAM(S): at 17:11

## 2017-07-02 RX ADMIN — Medication 250 MILLIGRAM(S): at 06:36

## 2017-07-02 RX ADMIN — PIPERACILLIN AND TAZOBACTAM 25 GRAM(S): 4; .5 INJECTION, POWDER, LYOPHILIZED, FOR SOLUTION INTRAVENOUS at 13:10

## 2017-07-02 RX ADMIN — GABAPENTIN 300 MILLIGRAM(S): 400 CAPSULE ORAL at 05:16

## 2017-07-02 RX ADMIN — GABAPENTIN 300 MILLIGRAM(S): 400 CAPSULE ORAL at 13:09

## 2017-07-02 RX ADMIN — ATORVASTATIN CALCIUM 40 MILLIGRAM(S): 80 TABLET, FILM COATED ORAL at 21:40

## 2017-07-02 RX ADMIN — HEPARIN SODIUM 5000 UNIT(S): 5000 INJECTION INTRAVENOUS; SUBCUTANEOUS at 05:16

## 2017-07-02 RX ADMIN — PIPERACILLIN AND TAZOBACTAM 25 GRAM(S): 4; .5 INJECTION, POWDER, LYOPHILIZED, FOR SOLUTION INTRAVENOUS at 05:16

## 2017-07-02 RX ADMIN — HEPARIN SODIUM 5000 UNIT(S): 5000 INJECTION INTRAVENOUS; SUBCUTANEOUS at 21:40

## 2017-07-02 RX ADMIN — HEPARIN SODIUM 5000 UNIT(S): 5000 INJECTION INTRAVENOUS; SUBCUTANEOUS at 13:10

## 2017-07-02 RX ADMIN — Medication 100 MILLIGRAM(S): at 13:09

## 2017-07-02 NOTE — PROGRESS NOTE ADULT - PROBLEM SELECTOR PLAN 1
- c/w atrovent   - c/w vanc/zosyn for empiric coverage  - BCx negative  - per pt, Dr. Cardona reluctant to do a VATS procedure  - continue to monitor WBC, trending down  -given pt improving, will hold off on steroids even though cytp and path from broch showed eosinophils, which can be possible eosinophilic PNA

## 2017-07-02 NOTE — PROGRESS NOTE ADULT - SUBJECTIVE AND OBJECTIVE BOX
PULMONARY PROGRESS NOTE    DUBIN, SHELLY  MRN-[id number]    Patient is a 76y old  Female who presents with a chief complaint of hypotension hypoxia s/p bronchoscopy (28 Jun 2017 14:44)      HPI:  -appears well, walked off O2 and felt good.  cough "breaking up" more productive    ROS:  -no fever    MEDICATIONS  (STANDING):  aspirin  chewable 81 milliGRAM(s) Oral daily  gabapentin 300 milliGRAM(s) Oral three times a day  atorvastatin 40 milliGRAM(s) Oral at bedtime  piperacillin/tazobactam IVPB. 3.375 Gram(s) IV Intermittent every 8 hours  vancomycin  IVPB 1000 milliGRAM(s) IV Intermittent every 12 hours  heparin  Injectable 5000 Unit(s) SubCutaneous every 8 hours  metoprolol 12.5 milliGRAM(s) Oral two times a day  sodium chloride 0.9%. 1000 milliLiter(s) (75 mL/Hr) IV Continuous <Continuous>  senna 2 Tablet(s) Oral at bedtime  docusate sodium 100 milliGRAM(s) Oral three times a day    MEDICATIONS  (PRN):  ipratropium    for Nebulization 500 MICROGram(s) Inhalation every 6 hours PRN Shortness of Breath and/or Wheezing    Vital Signs Last 24 Hrs  T(C): 36.6 (02 Jul 2017 04:58), Max: 37.1 (01 Jul 2017 21:32)  T(F): 97.9 (02 Jul 2017 04:58), Max: 98.7 (01 Jul 2017 21:32)  HR: 92 (02 Jul 2017 04:58) (81 - 92)  BP: 91/58 (02 Jul 2017 10:15) (91/58 - 103/60)  BP(mean): --  RR: 18 (02 Jul 2017 04:58) (17 - 18)  SpO2: 94% (02 Jul 2017 04:58) (94% - 95%)  GENERAL: The patient is awake and alert in no apparent distress.     LUNGS: Clear to auscultation without wheezing, rales or rhonchi; respirations unlabored    HEART: Regular rate and rhythm without murmur.    LABS/IMAGING: reviewed    WBC 14.5      PROBLEM LIST:  76y Female with HEALTH ISSUES - PROBLEM Dx:  Pneumonia: Pneumonia  Hypoxia: Hypoxia    RECS:  -Cyto and path from bronch with eosinophils, possible eosin. pna., patient improving on antibiotics, would hold off on steroids for now and cont abx.  if worsens will likely put on steroids.  -Would like to keep in house today, wean off O2, plan for dc tomorrow after antibiotic doses  -PRN duonebs    discussed with pt and she agrees    Valerie Nicholson MD   [phone]

## 2017-07-02 NOTE — PROGRESS NOTE ADULT - SUBJECTIVE AND OBJECTIVE BOX
Subjective        VITAL SIGNS:  Vital Signs Last 24 Hrs  T(C): 36.6 (02 Jul 2017 13:00), Max: 37.1 (01 Jul 2017 21:32)  T(F): 97.9 (02 Jul 2017 13:00), Max: 98.7 (01 Jul 2017 21:32)  HR: 90 (02 Jul 2017 13:00) (89 - 92)  BP: 93/58 (02 Jul 2017 13:00) (91/58 - 103/60)  BP(mean): --  RR: 18 (02 Jul 2017 13:00) (18 - 18)  SpO2: 95% (02 Jul 2017 13:00) (94% - 95%)      PHYSICAL EXAM:     GENERAL: no acute distress  HEENT: PERRLA, EOMI, moist oropharynx   RESPIRATORY: CTAB, no w/c   CARDIOVASCULAR: RRR, no murmurs, gallops, rubs  ABDOMINAL: soft, non-tender, non-distended, positive bowel sounds   EXTREMITIES: no clubbing, cyanosis, or edema  NEUROLOGICAL: alert and oriented x 3, non-focal  SKIN: no rashes or lesions   MUSCULOSKELETAL: no gross joint deformity                          11.0   14.5  )-----------( 375      ( 02 Jul 2017 05:45 )             32.9     07-02    142  |  107  |  17  ----------------------------<  112<H>  3.7   |  24  |  0.77    Ca    9.0      02 Jul 2017 05:45  Phos  4.0     07-01  Mg     1.9     07-01    TPro  6.1  /  Alb  2.6<L>  /  TBili  0.3  /  DBili  x   /  AST  30  /  ALT  28  /  AlkPhos  81  07-01      CAPILLARY BLOOD GLUCOSE          MEDICATIONS  (STANDING):  aspirin  chewable 81 milliGRAM(s) Oral daily  gabapentin 300 milliGRAM(s) Oral three times a day  atorvastatin 40 milliGRAM(s) Oral at bedtime  piperacillin/tazobactam IVPB. 3.375 Gram(s) IV Intermittent every 8 hours  heparin  Injectable 5000 Unit(s) SubCutaneous every 8 hours  metoprolol 12.5 milliGRAM(s) Oral two times a day  sodium chloride 0.9%. 1000 milliLiter(s) (75 mL/Hr) IV Continuous <Continuous>  senna 2 Tablet(s) Oral at bedtime  docusate sodium 100 milliGRAM(s) Oral three times a day  vancomycin  IVPB 1250 milliGRAM(s) IV Intermittent every 12 hours Subjective No ON events. Pt says she feels better with breathing         VITAL SIGNS:  Vital Signs Last 24 Hrs  T(C): 36.6 (02 Jul 2017 13:00), Max: 37.1 (01 Jul 2017 21:32)  T(F): 97.9 (02 Jul 2017 13:00), Max: 98.7 (01 Jul 2017 21:32)  HR: 90 (02 Jul 2017 13:00) (89 - 92)  BP: 93/58 (02 Jul 2017 13:00) (91/58 - 103/60)  BP(mean): --  RR: 18 (02 Jul 2017 13:00) (18 - 18)  SpO2: 95% (02 Jul 2017 13:00) (94% - 95%)      PHYSICAL EXAM:     GENERAL: no acute distress  HEENT: PERRLA, EOMI, moist oropharynx   RESPIRATORY: CTAB, no w/c   CARDIOVASCULAR: RRR, no murmurs, gallops, rubs  ABDOMINAL: soft, non-tender, non-distended, positive bowel sounds   EXTREMITIES: no clubbing, cyanosis, or edema  NEUROLOGICAL: alert and oriented x 3, non-focal  SKIN: no rashes or lesions   MUSCULOSKELETAL: no gross joint deformity                          11.0   14.5  )-----------( 375      ( 02 Jul 2017 05:45 )             32.9     07-02    142  |  107  |  17  ----------------------------<  112<H>  3.7   |  24  |  0.77    Ca    9.0      02 Jul 2017 05:45  Phos  4.0     07-01  Mg     1.9     07-01    TPro  6.1  /  Alb  2.6<L>  /  TBili  0.3  /  DBili  x   /  AST  30  /  ALT  28  /  AlkPhos  81  07-01      CAPILLARY BLOOD GLUCOSE          MEDICATIONS  (STANDING):  aspirin  chewable 81 milliGRAM(s) Oral daily  gabapentin 300 milliGRAM(s) Oral three times a day  atorvastatin 40 milliGRAM(s) Oral at bedtime  piperacillin/tazobactam IVPB. 3.375 Gram(s) IV Intermittent every 8 hours  heparin  Injectable 5000 Unit(s) SubCutaneous every 8 hours  metoprolol 12.5 milliGRAM(s) Oral two times a day  sodium chloride 0.9%. 1000 milliLiter(s) (75 mL/Hr) IV Continuous <Continuous>  senna 2 Tablet(s) Oral at bedtime  docusate sodium 100 milliGRAM(s) Oral three times a day  vancomycin  IVPB 1250 milliGRAM(s) IV Intermittent every 12 hours Subjective No ON events. Pt says she feels better with breathing         VITAL SIGNS:  Vital Signs Last 24 Hrs  T(C): 36.6 (02 Jul 2017 13:00), Max: 37.1 (01 Jul 2017 21:32)  T(F): 97.9 (02 Jul 2017 13:00), Max: 98.7 (01 Jul 2017 21:32)  HR: 90 (02 Jul 2017 13:00) (89 - 92)  BP: 93/58 (02 Jul 2017 13:00) (91/58 - 103/60)  BP(mean): --  RR: 18 (02 Jul 2017 13:00) (18 - 18)  SpO2: 95% (02 Jul 2017 13:00) (94% - 95%)      PHYSICAL EXAM:     GENERAL: no acute distress  HEENT: PERRLA, EOMI, moist oropharynx   RESPIRATORY: CTAB, no wheezing, no rales   CARDIOVASCULAR: RRR, no murmurs, gallops, rubs  ABDOMINAL: soft, non-tender, non-distended, positive bowel sounds   EXTREMITIES: no clubbing, cyanosis, or edema  NEUROLOGICAL: alert and oriented x 3, non-focal  SKIN: no rashes or lesions   MUSCULOSKELETAL: no gross joint deformity                          11.0   14.5  )-----------( 375      ( 02 Jul 2017 05:45 )             32.9     07-02    142  |  107  |  17  ----------------------------<  112<H>  3.7   |  24  |  0.77    Ca    9.0      02 Jul 2017 05:45  Phos  4.0     07-01  Mg     1.9     07-01    TPro  6.1  /  Alb  2.6<L>  /  TBili  0.3  /  DBili  x   /  AST  30  /  ALT  28  /  AlkPhos  81  07-01      CAPILLARY BLOOD GLUCOSE    MEDICATIONS  (STANDING):  aspirin  chewable 81 milliGRAM(s) Oral daily  gabapentin 300 milliGRAM(s) Oral three times a day  atorvastatin 40 milliGRAM(s) Oral at bedtime  piperacillin/tazobactam IVPB. 3.375 Gram(s) IV Intermittent every 8 hours  heparin  Injectable 5000 Unit(s) SubCutaneous every 8 hours  metoprolol 12.5 milliGRAM(s) Oral two times a day  sodium chloride 0.9%. 1000 milliLiter(s) (75 mL/Hr) IV Continuous <Continuous>  senna 2 Tablet(s) Oral at bedtime  docusate sodium 100 milliGRAM(s) Oral three times a day  vancomycin  IVPB 1250 milliGRAM(s) IV Intermittent every 12 hours

## 2017-07-03 ENCOUNTER — TRANSCRIPTION ENCOUNTER (OUTPATIENT)
Age: 77
End: 2017-07-03

## 2017-07-03 VITALS
OXYGEN SATURATION: 93 % | DIASTOLIC BLOOD PRESSURE: 56 MMHG | RESPIRATION RATE: 18 BRPM | HEART RATE: 86 BPM | TEMPERATURE: 98 F | SYSTOLIC BLOOD PRESSURE: 101 MMHG

## 2017-07-03 LAB
CULTURE RESULTS: SIGNIFICANT CHANGE UP
CULTURE RESULTS: SIGNIFICANT CHANGE UP
HCT VFR BLD CALC: 34.1 % — LOW (ref 34.5–45)
HGB BLD-MCNC: 11.3 G/DL — LOW (ref 11.5–15.5)
MCHC RBC-ENTMCNC: 30.2 PG — SIGNIFICANT CHANGE UP (ref 27–34)
MCHC RBC-ENTMCNC: 33.3 GM/DL — SIGNIFICANT CHANGE UP (ref 32–36)
MCV RBC AUTO: 90.8 FL — SIGNIFICANT CHANGE UP (ref 80–100)
PLATELET # BLD AUTO: 375 K/UL — SIGNIFICANT CHANGE UP (ref 150–400)
RBC # BLD: 3.76 M/UL — LOW (ref 3.8–5.2)
RBC # FLD: 13.8 % — SIGNIFICANT CHANGE UP (ref 10.3–14.5)
SPECIMEN SOURCE: SIGNIFICANT CHANGE UP
SPECIMEN SOURCE: SIGNIFICANT CHANGE UP
TM INTERPRETATION: SIGNIFICANT CHANGE UP
WBC # BLD: 13.7 K/UL — HIGH (ref 3.8–10.5)
WBC # FLD AUTO: 13.7 K/UL — HIGH (ref 3.8–10.5)

## 2017-07-03 PROCEDURE — 84132 ASSAY OF SERUM POTASSIUM: CPT

## 2017-07-03 PROCEDURE — 83605 ASSAY OF LACTIC ACID: CPT

## 2017-07-03 PROCEDURE — 93005 ELECTROCARDIOGRAM TRACING: CPT

## 2017-07-03 PROCEDURE — 85014 HEMATOCRIT: CPT

## 2017-07-03 PROCEDURE — 82947 ASSAY GLUCOSE BLOOD QUANT: CPT

## 2017-07-03 PROCEDURE — 85610 PROTHROMBIN TIME: CPT

## 2017-07-03 PROCEDURE — 83735 ASSAY OF MAGNESIUM: CPT

## 2017-07-03 PROCEDURE — 80202 ASSAY OF VANCOMYCIN: CPT

## 2017-07-03 PROCEDURE — 89051 BODY FLUID CELL COUNT: CPT

## 2017-07-03 PROCEDURE — 76000 FLUOROSCOPY <1 HR PHYS/QHP: CPT

## 2017-07-03 PROCEDURE — 80053 COMPREHEN METABOLIC PANEL: CPT

## 2017-07-03 PROCEDURE — 87070 CULTURE OTHR SPECIMN AEROBIC: CPT

## 2017-07-03 PROCEDURE — 99239 HOSP IP/OBS DSCHRG MGMT >30: CPT

## 2017-07-03 PROCEDURE — 84295 ASSAY OF SERUM SODIUM: CPT

## 2017-07-03 PROCEDURE — 88305 TISSUE EXAM BY PATHOLOGIST: CPT

## 2017-07-03 PROCEDURE — 82553 CREATINE MB FRACTION: CPT

## 2017-07-03 PROCEDURE — 87015 SPECIMEN INFECT AGNT CONCNTJ: CPT

## 2017-07-03 PROCEDURE — 85027 COMPLETE CBC AUTOMATED: CPT

## 2017-07-03 PROCEDURE — 87102 FUNGUS ISOLATION CULTURE: CPT

## 2017-07-03 PROCEDURE — 80048 BASIC METABOLIC PNL TOTAL CA: CPT

## 2017-07-03 PROCEDURE — 87040 BLOOD CULTURE FOR BACTERIA: CPT

## 2017-07-03 PROCEDURE — 82803 BLOOD GASES ANY COMBINATION: CPT

## 2017-07-03 PROCEDURE — 88312 SPECIAL STAINS GROUP 1: CPT

## 2017-07-03 PROCEDURE — 84100 ASSAY OF PHOSPHORUS: CPT

## 2017-07-03 PROCEDURE — 71045 X-RAY EXAM CHEST 1 VIEW: CPT

## 2017-07-03 PROCEDURE — 84443 ASSAY THYROID STIM HORMONE: CPT

## 2017-07-03 PROCEDURE — 71010: CPT | Mod: 26

## 2017-07-03 PROCEDURE — 93306 TTE W/DOPPLER COMPLETE: CPT

## 2017-07-03 PROCEDURE — 84484 ASSAY OF TROPONIN QUANT: CPT

## 2017-07-03 PROCEDURE — 87116 MYCOBACTERIA CULTURE: CPT

## 2017-07-03 PROCEDURE — 88184 FLOWCYTOMETRY/ TC 1 MARKER: CPT

## 2017-07-03 PROCEDURE — 88185 FLOWCYTOMETRY/TC ADD-ON: CPT

## 2017-07-03 PROCEDURE — 88112 CYTOPATH CELL ENHANCE TECH: CPT

## 2017-07-03 PROCEDURE — 82330 ASSAY OF CALCIUM: CPT

## 2017-07-03 PROCEDURE — 87206 SMEAR FLUORESCENT/ACID STAI: CPT

## 2017-07-03 PROCEDURE — 82435 ASSAY OF BLOOD CHLORIDE: CPT

## 2017-07-03 PROCEDURE — 85730 THROMBOPLASTIN TIME PARTIAL: CPT

## 2017-07-03 PROCEDURE — 82533 TOTAL CORTISOL: CPT

## 2017-07-03 PROCEDURE — 82550 ASSAY OF CK (CPK): CPT

## 2017-07-03 RX ORDER — METOPROLOL TARTRATE 50 MG
0.5 TABLET ORAL
Qty: 30 | Refills: 0 | OUTPATIENT
Start: 2017-07-03 | End: 2017-08-02

## 2017-07-03 RX ORDER — METOPROLOL TARTRATE 50 MG
1 TABLET ORAL
Qty: 0 | Refills: 0 | COMMUNITY

## 2017-07-03 RX ADMIN — GABAPENTIN 300 MILLIGRAM(S): 400 CAPSULE ORAL at 13:29

## 2017-07-03 RX ADMIN — Medication 12.5 MILLIGRAM(S): at 08:24

## 2017-07-03 RX ADMIN — PIPERACILLIN AND TAZOBACTAM 25 GRAM(S): 4; .5 INJECTION, POWDER, LYOPHILIZED, FOR SOLUTION INTRAVENOUS at 08:25

## 2017-07-03 RX ADMIN — Medication 166.67 MILLIGRAM(S): at 05:51

## 2017-07-03 RX ADMIN — GABAPENTIN 300 MILLIGRAM(S): 400 CAPSULE ORAL at 05:51

## 2017-07-03 RX ADMIN — HEPARIN SODIUM 5000 UNIT(S): 5000 INJECTION INTRAVENOUS; SUBCUTANEOUS at 05:51

## 2017-07-03 RX ADMIN — PIPERACILLIN AND TAZOBACTAM 25 GRAM(S): 4; .5 INJECTION, POWDER, LYOPHILIZED, FOR SOLUTION INTRAVENOUS at 00:24

## 2017-07-03 RX ADMIN — Medication 81 MILLIGRAM(S): at 12:03

## 2017-07-03 NOTE — DISCHARGE NOTE ADULT - FINDINGS/TREATMENT
Cytology slide and cell block contain a moderately cellular  specimen composed of benign appearing ciliated bronchial  epithelial cells, rare squamous epithelial cells, macrophages,  inflammatory cells and many eosinophils.

## 2017-07-03 NOTE — DISCHARGE NOTE ADULT - CARE PROVIDER_API CALL
Adrian Field  [address] [place] [address]  Phone: (   )    -  Fax: (   )    -    Micah Munson), Critical Care Medicine; Internal Medicine; Pulmonary Disease; Sleep Medicine  [address] [facility] [address]  [place] [address]  Phone: [phone]  Fax: [phone]

## 2017-07-03 NOTE — DISCHARGE NOTE ADULT - PLAN OF CARE
Please follow outpatient with Dr. Cardona and Dr. Reno You were diagnosed with a pneumonia in the hospital. It needs to be determined if the pneumonia is from an infection or from an inflammatory disease. Please take 2 tabs of the prednisone 20 mg everyday, and take the Augmentin 875 mg twice a day. Continue to take the atorvastatin 40 mg and Aspirin 81 mg Please take half a tab of metoprolol 25 mg twice a day. Please follow up with your cardiologist Dr. Reno. You were diagnosed with a pneumonia in the hospital. It needs to be determined if the pneumonia is from an infection or from an inflammatory disease. Please take 2 tabs of the prednisone 20 mg everyday, and take the Augmentin 875 mg twice a day. Follow up with your pulmonologist within 1 week. Please take half a tab of metoprolol 25 mg twice a day. Please follow up with your cardiologist Dr. Reno within 1 week.

## 2017-07-03 NOTE — PROGRESS NOTE ADULT - PROBLEM SELECTOR PLAN 2
- successfully titrated off pressors in micu  - NS 1000 at 75 cc  - continue to monitor
V5-V6 changes after receiving terbutaline after IV infiltiration, tachycardia controlled with metoprolol  - c/w metoprolol 12.5 mg bid  - c/w ASA and atorvastatin
V5-V6 changes after receving terbutaline after IV infiltiration, tachycardia controlled with metoprolol  - c/w metoprolol 12.5 mg bid  - c/w ASA and atorvastatin
V5-V6 changes after receving terbutaline after IV infiltiration, tachycardia controlled with metoprolol  - c/w metoprolol 12.5 mg bid  - c/w ASA and atorvastatin
pan culture  cxr post bronchoscopy  zosyn 3.375q 8 h  vanco 1 gr IV q  f/u bronch results and cultures
V5-V6 changes after receving terbutaline after IV infiltiration, tachycardia controlled with metoprolol  - c/w metoprolol 12.5 mg bid  - c/w ASA and atorvastatin

## 2017-07-03 NOTE — PROGRESS NOTE ADULT - PROBLEM SELECTOR PROBLEM 3
CAD (coronary artery disease)
Hypotension, unspecified hypotension type
R/O Myocardial infarct
Hypotension, unspecified hypotension type

## 2017-07-03 NOTE — DISCHARGE NOTE ADULT - PROVIDER TOKENS
FREE:[LAST:[Emir],FIRST:[Adrian],PHONE:[(   )    -],FAX:[(   [phone]    -],ADDRESS:[[address] [place] [address]]],TOKEN:'[id number]'

## 2017-07-03 NOTE — DISCHARGE NOTE ADULT - CARE PLAN
Principal Discharge DX:	Pneumonia  Goal:	Please follow outpatient with Dr. Cardona and Dr. Reno  Instructions for follow-up, activity and diet:	You were diagnosed with a pneumonia in the hospital. It needs to be determined if the pneumonia is from an infection or from an inflammatory disease. Please take 2 tabs of the prednisone 20 mg everyday, and take the Augmentin 875 mg twice a day.  Secondary Diagnosis:	Coronary artery disease, angina presence unspecified, unspecified vessel or lesion type, unspecified whether native or transplanted heart  Instructions for follow-up, activity and diet:	Continue to take the atorvastatin 40 mg and Aspirin 81 mg  Secondary Diagnosis:	EKG abnormalities  Instructions for follow-up, activity and diet:	Please take half a tab of metoprolol 25 mg twice a day. Please follow up with your cardiologist Dr. Reno. Principal Discharge DX:	Pneumonia  Goal:	Please follow outpatient with Dr. Cardona and Dr. Reno  Instructions for follow-up, activity and diet:	You were diagnosed with a pneumonia in the hospital. It needs to be determined if the pneumonia is from an infection or from an inflammatory disease. Please take 2 tabs of the prednisone 20 mg everyday, and take the Augmentin 875 mg twice a day. Follow up with your pulmonologist within 1 week.  Secondary Diagnosis:	Coronary artery disease, angina presence unspecified, unspecified vessel or lesion type, unspecified whether native or transplanted heart  Instructions for follow-up, activity and diet:	Continue to take the atorvastatin 40 mg and Aspirin 81 mg  Secondary Diagnosis:	CAD (coronary artery disease)  Instructions for follow-up, activity and diet:	Please take half a tab of metoprolol 25 mg twice a day. Please follow up with your cardiologist Dr. Reno within 1 week.

## 2017-07-03 NOTE — PROGRESS NOTE ADULT - ATTENDING COMMENTS
No further hypotension ? medication related hypotension versus hypovolemia -   Empiric Abx although not clear that this is infectious process - based on lack of fever, lack of secretions and cont dyspnea with exertion  ?  vs chronic eos pna - consider lung bx vs empiric abx but defer to primary pulmonologist  Chest pressure with tachycardia in the setting of terbutaline - improved immediately with rate control - appreciate cardiology f-u  may dc to floor
d/c planning, total d/c time35 minutes
Hypotension during bronchoscopy and persistent after bronchoscopy with slightly elevated lactate - lactate decreased to nl now, no effect on end-organs (renal, neuro) and cardiac enzymes negative. Unclear cause --> echo ; ACTH stim test --cont on norepi  ID - doubt this is an infectious process given the lack of findings on bronch and afebrile without sxs on exam - empiric vanco/zosyn until cx from BAL finalized (GPC in pairs of unclear sig)  Resp distress with cough and mild hypoxemia - moving infiltrative pattern refractory to two abx strongly suggestive of /Chronic Eos Pna/HP - added on CD4:CD8 to BAL sent. No parenchyma on bronch therefore needs VATS if definitive dx needed before steroids - defer to Dr. Munson and we have discussed this today.

## 2017-07-03 NOTE — PROGRESS NOTE ADULT - SUBJECTIVE AND OBJECTIVE BOX
PULMONARY PROGRESS NOTE    DUBIN, SHELLY  MRN-[id number]    Patient is a 76y old  Female who presents with a chief complaint of hypotension hypoxia s/p bronchoscopy (28 Jun 2017 14:44)    Doing better; walking without 02  denies cough  no reported fever.  BP stable off pressors    -    ACTIVE MEDICATION LIST:  MEDICATIONS  (STANDING):  aspirin  chewable 81 milliGRAM(s) Oral daily  gabapentin 300 milliGRAM(s) Oral three times a day  atorvastatin 40 milliGRAM(s) Oral at bedtime  piperacillin/tazobactam IVPB. 3.375 Gram(s) IV Intermittent every 8 hours  heparin  Injectable 5000 Unit(s) SubCutaneous every 8 hours  metoprolol 12.5 milliGRAM(s) Oral two times a day  sodium chloride 0.9%. 1000 milliLiter(s) (75 mL/Hr) IV Continuous <Continuous>  senna 2 Tablet(s) Oral at bedtime  docusate sodium 100 milliGRAM(s) Oral three times a day  vancomycin  IVPB 1250 milliGRAM(s) IV Intermittent every 12 hours    MEDICATIONS  (PRN):  ipratropium    for Nebulization 500 MICROGram(s) Inhalation every 6 hours PRN Shortness of Breath and/or Wheezing      EXAM:  Vital Signs Last 24 Hrs  T(C): 36.7 (03 Jul 2017 05:00), Max: 36.7 (02 Jul 2017 20:07)  T(F): 98 (03 Jul 2017 05:00), Max: 98.1 (02 Jul 2017 20:07)  HR: 81 (03 Jul 2017 05:00) (81 - 102)  BP: 100/61 (03 Jul 2017 05:00) (91/58 - 100/61)  BP(mean): --  RR: 18 (03 Jul 2017 05:00) (18 - 18)  SpO2: 95% (03 Jul 2017 05:00) (93% - 95%)    GENERAL: The patient is awake and alert in no apparent distress.     SKIN: Warm, dry, no rashes    LUNGS: Clear to auscultation without wheezing, rales or rhonchi; respirations unlabored    HEART: Regular rate and rhythm without murmur.    ABDOMEN: +BS, Soft, Nontender    EXTREMITIES: No clubbing, cyanosis, edema                              11.3   13.7  )-----------( 375      ( 03 Jul 2017 06:40 )             34.1       07-02    142  |  107  |  17  ----------------------------<  112<H>  3.7   |  24  |  0.77    Ca    9.0      02 Jul 2017 05:45    EXTREMITIES: No clubbing, cyanosis, edema                        PROBLEM LIST:  76y Female with HEALTH ISSUES - PROBLEM Dx:  CAD (coronary artery disease): CAD (coronary artery disease)  Need for prophylactic measure: Need for prophylactic measure  Hypotension, unspecified hypotension type: Hypotension, unspecified hypotension type  EKG abnormalities: EKG abnormalities  Pneumonia due to infectious organism, unspecified laterality, unspecified part of lung: Pneumonia due to infectious organism, unspecified laterality, unspecified part of lung  Pneumonia: Pneumonia  Hypoxia: Hypoxia            RECS:    Repeat CXR  Change to po antibiotics (to complete 10 days)  May discharge today  Had long risk/benefit discussion about VATS with pt and family-given periop events from bronch, would be very concerned about periop risk of VATS.  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

## 2017-07-03 NOTE — PROGRESS NOTE ADULT - PROBLEM SELECTOR PLAN 3
- BP stable, patient mentating well  - continue to monitor
- successfully titrated off pressors in micu  - NS 1000 at 75 cc  - continue to monitor
- successfully titrated off pressors in micu  - NS 1000 at 75 cc  - continue to monitor
-c/w with statin, asa
ECG now and q am x 3  cardiac enzymes now and q 8 hr x three  TTE to eval LVFx  supplemental O2 to maintain SPO2>/= 92%
- successfully titrated off pressors in micu  - NS 1000 at 75 cc  - continue to monitor

## 2017-07-03 NOTE — PROGRESS NOTE ADULT - PROVIDER SPECIALTY LIST ADULT
Cardiology
Hospitalist
Internal Medicine
MICU
Pulmonology
Internal Medicine

## 2017-07-03 NOTE — DISCHARGE NOTE ADULT - HOSPITAL COURSE
Hospital Admission:    The patient is a 75 yo female with a history of CAD CABG presented with hypoxia desating into 80s hypertension requirin.g pressors during a bronchoscopy. The bronchoscopy was on elective procedure done for work up of non infectious causes where she had the desaturation event. Chest x-rays done which showed bilateral infiltrates referral pattern complicated with elevated white CT chest on the 22nd and showed extensive consolidations with air bronchogram lavage was done showed where gram positive cocci in pairs Gram stain and Bronco cultures of grown normal raj. Patient was eventually transferred the Medicine floor after determined bilateral infiltrates to have been in the context of eosinophilia. No improvement from antibiotics, no fever, and dry cough suggesting a non infectious cause pneumonia. 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 MICU course Complicated by an infiltrated IV with levophed she is treated with terbutaline, EKG showed v5-v6 changes. Patients tachycardia with metoprolol. On floor continued with vanc and zosyn were continued, white blood cell count monitored continued to trend down. Pt clinically improved is able to breathe normally on room air with no shortness of breath or increased work of breathing, decision was made to discharge on course of antibiotics and steroids. With her outpatient pulmonologist Dr. Munson there was a discussion regarding VATS procedure, decided not to do due to the perioperative risk. 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 Patient to be started on Prednisone 40 mg with no taper.

## 2017-07-03 NOTE — PROGRESS NOTE ADULT - PROBLEM SELECTOR PROBLEM 2
EKG abnormalities
Hypotension, unspecified hypotension type
Pneumonia
EKG abnormalities

## 2017-07-03 NOTE — PROGRESS NOTE ADULT - ASSESSMENT
76yoF hx CAD/CAGB p/w hypotension requiring pressor support and hypoxia s/p bronchoscopy - both now resolved. Patient has bilateral infiltrates in context of mild eosinophilia, lack of improvement in antibiotics, and lack of fever - likely noninfectious pna, such as eosinophilic and cryptofenic pna. 76yoF hx CAD/CAGB p/w hypotension requiring pressor support and hypoxia s/p bronchoscopy - both now resolved. Patient has bilateral infiltrates in context of mild eosinophilia, lack of improvement in antibiotics, and lack of fever - likely noninfectious pna, such as eosinophilic  pna.

## 2017-07-03 NOTE — DISCHARGE NOTE ADULT - MEDICATION SUMMARY - MEDICATIONS TO TAKE
I will START or STAY ON the medications listed below when I get home from the hospital:    predniSONE 20 mg oral tablet  -- 2 tab(s) by mouth once a day  -- It is very important that you take or use this exactly as directed.  Do not skip doses or discontinue unless directed by your doctor.  Obtain medical advice before taking any non-prescription drugs as some may affect the action of this medication.  Take with food or milk.    -- Indication: For Pneumonia    aspirin 81 mg oral tablet  -- 1 tab(s) by mouth once a day  -- Indication: For CAD (coronary artery disease)    gabapentin 300 mg oral capsule  -- 1 cap(s) by mouth 3 times a day  -- Indication: For Pain    atorvastatin 40 mg oral tablet  -- 1 tab(s) by mouth once a day  -- Indication: For CAD (coronary artery disease)    metoprolol tartrate 25 mg oral tablet  -- 0.5 tab(s) by mouth 2 times a day  -- It is very important that you take or use this exactly as directed.  Do not skip doses or discontinue unless directed by your doctor.  May cause drowsiness.  Alcohol may intensify this effect.  Use care when operating dangerous machinery.  Some non-prescription drugs may aggravate your condition.  Read all labels carefully.  If a warning appears, check with your doctor before taking.  Take with food or milk.  This drug may impair the ability to drive or operate machinery.  Use care until you become familiar with its effects.    -- Indication: For EKG abnormalities    Augmentin 875 mg-125 mg oral tablet  -- 875 milligram(s) by mouth 2 times a day  -- Finish all this medication unless otherwise directed by prescriber.  Take with food or milk.    -- Indication: For Pneumonia I will START or STAY ON the medications listed below when I get home from the hospital:    predniSONE 20 mg oral tablet  -- 2 tab(s) by mouth once a day  -- It is very important that you take or use this exactly as directed.  Do not skip doses or discontinue unless directed by your doctor.  Obtain medical advice before taking any non-prescription drugs as some may affect the action of this medication.  Take with food or milk.    -- Indication: For Pneumonia    aspirin 81 mg oral tablet  -- 1 tab(s) by mouth once a day  -- Indication: For CAD (coronary artery disease)    gabapentin 300 mg oral capsule  -- 1 cap(s) by mouth 3 times a day  -- Indication: For Pain    atorvastatin 40 mg oral tablet  -- 1 tab(s) by mouth once a day  -- Indication: For CAD (coronary artery disease)    metoprolol tartrate 25 mg oral tablet  -- 0.5 tab(s) by mouth 2 times a day  -- It is very important that you take or use this exactly as directed.  Do not skip doses or discontinue unless directed by your doctor.  May cause drowsiness.  Alcohol may intensify this effect.  Use care when operating dangerous machinery.  Some non-prescription drugs may aggravate your condition.  Read all labels carefully.  If a warning appears, check with your doctor before taking.  Take with food or milk.  This drug may impair the ability to drive or operate machinery.  Use care until you become familiar with its effects.    -- Indication: For CAD (coronary artery disease)    Augmentin 875 mg-125 mg oral tablet  -- 875 milligram(s) by mouth 2 times a day  -- Finish all this medication unless otherwise directed by prescriber.  Take with food or milk.    -- Indication: For Pneumonia

## 2017-07-03 NOTE — PROGRESS NOTE ADULT - SUBJECTIVE AND OBJECTIVE BOX
Overnight Events:    Pt reports significant improvment overnight, no SOB, no WOB. no pain, eating well, sleeping well.     VITAL SIGNS:    Vital Signs Last 24 Hrs  T(C): 36.7 (03 Jul 2017 14:00), Max: 36.7 (02 Jul 2017 20:07)  T(F): 98 (03 Jul 2017 14:00), Max: 98.1 (02 Jul 2017 20:07)  HR: 86 (03 Jul 2017 14:00) (81 - 102)  BP: 101/56 (03 Jul 2017 14:00) (99/63 - 116/72)  BP(mean): --  RR: 18 (03 Jul 2017 14:00) (18 - 18)  SpO2: 93% (03 Jul 2017 14:00) (93% - 97%)    I&O's Summary    02 Jul 2017 07:01  -  03 Jul 2017 07:00  --------------------------------------------------------  IN: 350 mL / OUT: 0 mL / NET: 350 mL    03 Jul 2017 07:01  -  03 Jul 2017 18:35  --------------------------------------------------------  IN: 100 mL / OUT: 0 mL / NET: 100 mL          PHYSICAL EXAM:     GENERAL: no acute distress  HEENT: NC/AT, EOMI, neck supple, MMM  RESPIRATORY: LCTAB/L, no rhonchi, rales, or wheezing  CARDIOVASCULAR: RRR, no murmurs, gallops, rubs  ABDOMINAL: soft, non-tender, non-distended, positive bowel sounds   EXTREMITIES: no clubbing, cyanosis, or edema  NEUROLOGICAL: alert and oriented x 3, non-focal  SKIN: no rashes or lesions   MUSCULOSKELETAL: no gross joint deformity                          11.3   13.7  )-----------( 375      ( 03 Jul 2017 06:40 )             34.1     07-02    142  |  107  |  17  ----------------------------<  112<H>  3.7   |  24  |  0.77    Ca    9.0      02 Jul 2017 05:45        CAPILLARY BLOOD GLUCOSE          MEDICATIONS  (STANDING):  aspirin  chewable 81 milliGRAM(s) Oral daily  gabapentin 300 milliGRAM(s) Oral three times a day  atorvastatin 40 milliGRAM(s) Oral at bedtime  piperacillin/tazobactam IVPB. 3.375 Gram(s) IV Intermittent every 8 hours  heparin  Injectable 5000 Unit(s) SubCutaneous every 8 hours  metoprolol 12.5 milliGRAM(s) Oral two times a day  sodium chloride 0.9%. 1000 milliLiter(s) (75 mL/Hr) IV Continuous <Continuous>  senna 2 Tablet(s) Oral at bedtime  docusate sodium 100 milliGRAM(s) Oral three times a day  vancomycin  IVPB 1250 milliGRAM(s) IV Intermittent every 12 hours      Allergies    codeine (Nausea)    Intolerances        Radiology

## 2017-07-03 NOTE — DISCHARGE NOTE ADULT - ADDITIONAL INSTRUCTIONS
Please follow up with your pulmonologist within 1 week.   Please follow up with your cardiologist within 1-2 weeks.

## 2017-07-03 NOTE — DISCHARGE NOTE ADULT - MEDICATION SUMMARY - MEDICATIONS TO STOP TAKING
I will STOP taking the medications listed below when I get home from the hospital:    metoprolol succinate 50 mg oral tablet, extended release  -- 1 tab(s) by mouth once a day

## 2017-07-03 NOTE — DISCHARGE NOTE ADULT - SECONDARY DIAGNOSIS.
Coronary artery disease, angina presence unspecified, unspecified vessel or lesion type, unspecified whether native or transplanted heart EKG abnormalities CAD (coronary artery disease)

## 2017-07-07 LAB
CORTICOSTEROID BINDING GLOBULIN RESULT: 2.9 MG/DL — SIGNIFICANT CHANGE UP
CORTICOSTEROID BINDING GLOBULIN RESULT: 3.3 MG/DL — HIGH
CORTICOSTEROID BINDING GLOBULIN RESULT: 3.6 MG/DL — HIGH
CORTIS F/TOTAL MFR SERPL: 1.6 % — SIGNIFICANT CHANGE UP
CORTIS F/TOTAL MFR SERPL: 33 % — SIGNIFICANT CHANGE UP
CORTIS F/TOTAL MFR SERPL: 43 % — SIGNIFICANT CHANGE UP
CORTISOL, FREE RESULT: 0.04 UG/DL — SIGNIFICANT CHANGE UP
CORTISOL, FREE RESULT: 11 UG/DL — SIGNIFICANT CHANGE UP
CORTISOL, FREE RESULT: 15 UG/DL — SIGNIFICANT CHANGE UP
CORTISOL, TOTAL: 2.3 UG/DL — SIGNIFICANT CHANGE UP
CORTISOL, TOTAL: 33 UG/DL — SIGNIFICANT CHANGE UP
CORTISOL, TOTAL: 34 UG/DL — SIGNIFICANT CHANGE UP

## 2017-07-26 LAB
CULTURE RESULTS: SIGNIFICANT CHANGE UP
SPECIMEN SOURCE: SIGNIFICANT CHANGE UP

## 2017-08-16 LAB
CULTURE RESULTS: SIGNIFICANT CHANGE UP
SPECIMEN SOURCE: SIGNIFICANT CHANGE UP

## 2018-09-01 ENCOUNTER — RECORD ABSTRACTING (OUTPATIENT)
Age: 78
End: 2018-09-01

## 2018-09-01 DIAGNOSIS — Z87.01 PERSONAL HISTORY OF PNEUMONIA (RECURRENT): ICD-10-CM

## 2018-09-01 RX ORDER — ATORVASTATIN CALCIUM 40 MG/1
40 TABLET, FILM COATED ORAL
Refills: 0 | Status: ACTIVE | COMMUNITY

## 2018-09-08 NOTE — PROGRESS NOTE ADULT - PROBLEM SELECTOR PROBLEM 1
Hypoxia
Pneumonia due to infectious organism, unspecified laterality, unspecified part of lung
Yes
Pneumonia due to infectious organism, unspecified laterality, unspecified part of lung

## 2018-10-04 ENCOUNTER — APPOINTMENT (OUTPATIENT)
Dept: PULMONOLOGY | Facility: CLINIC | Age: 78
End: 2018-10-04
Payer: MEDICARE

## 2018-10-04 VITALS
HEIGHT: 61.5 IN | DIASTOLIC BLOOD PRESSURE: 61 MMHG | OXYGEN SATURATION: 96 % | WEIGHT: 126 LBS | TEMPERATURE: 98 F | SYSTOLIC BLOOD PRESSURE: 93 MMHG | BODY MASS INDEX: 23.48 KG/M2 | RESPIRATION RATE: 14 BRPM | HEART RATE: 61 BPM

## 2018-10-04 DIAGNOSIS — Z00.00 ENCOUNTER FOR GENERAL ADULT MEDICAL EXAMINATION W/OUT ABNORMAL FINDINGS: ICD-10-CM

## 2018-10-04 PROCEDURE — 94060 EVALUATION OF WHEEZING: CPT

## 2018-10-04 PROCEDURE — 95012 NITRIC OXIDE EXP GAS DETER: CPT

## 2018-10-04 PROCEDURE — G0008: CPT

## 2018-10-04 PROCEDURE — 99213 OFFICE O/P EST LOW 20 MIN: CPT | Mod: 25

## 2018-10-04 PROCEDURE — 90662 IIV NO PRSV INCREASED AG IM: CPT

## 2018-10-05 PROBLEM — Z00.00 ENCOUNTER FOR PREVENTIVE HEALTH EXAMINATION: Status: ACTIVE | Noted: 2017-06-22

## 2018-10-05 RX ORDER — METOPROLOL SUCCINATE 50 MG/1
50 TABLET, EXTENDED RELEASE ORAL
Qty: 90 | Refills: 0 | Status: ACTIVE | COMMUNITY
Start: 2017-11-09

## 2018-10-05 RX ORDER — ZOLPIDEM TARTRATE 5 MG/1
5 TABLET ORAL
Qty: 30 | Refills: 0 | Status: ACTIVE | COMMUNITY
Start: 2018-09-20

## 2018-10-08 LAB
ALBUMIN SERPL ELPH-MCNC: 4.5 G/DL
ALP BLD-CCNC: 121 U/L
ALT SERPL-CCNC: 25 U/L
ANION GAP SERPL CALC-SCNC: 13 MMOL/L
AST SERPL-CCNC: 32 U/L
BILIRUB SERPL-MCNC: 0.6 MG/DL
BUN SERPL-MCNC: 20 MG/DL
CALCIUM SERPL-MCNC: 9.8 MG/DL
CHLORIDE SERPL-SCNC: 101 MMOL/L
CO2 SERPL-SCNC: 26 MMOL/L
CREAT SERPL-MCNC: 1.06 MG/DL
GLUCOSE SERPL-MCNC: 98 MG/DL
POTASSIUM SERPL-SCNC: 4.8 MMOL/L
PROT SERPL-MCNC: 6.9 G/DL
SODIUM SERPL-SCNC: 140 MMOL/L
TOTAL IGE SMQN RAST: 410 KU/L

## 2018-12-31 ENCOUNTER — RX RENEWAL (OUTPATIENT)
Age: 78
End: 2018-12-31

## 2018-12-31 ENCOUNTER — MOBILE ON CALL (OUTPATIENT)
Age: 78
End: 2018-12-31

## 2019-01-16 ENCOUNTER — RX CHANGE (OUTPATIENT)
Age: 79
End: 2019-01-16

## 2019-03-25 ENCOUNTER — APPOINTMENT (OUTPATIENT)
Dept: PULMONOLOGY | Facility: CLINIC | Age: 79
End: 2019-03-25
Payer: MEDICARE

## 2019-03-25 VITALS
DIASTOLIC BLOOD PRESSURE: 66 MMHG | TEMPERATURE: 97.7 F | RESPIRATION RATE: 16 BRPM | HEART RATE: 63 BPM | SYSTOLIC BLOOD PRESSURE: 101 MMHG | OXYGEN SATURATION: 98 %

## 2019-03-25 LAB — POCT - HEMOGLOBIN (HGB), QUANTITATIVE, TRANSCUTANEOUS: 14.3

## 2019-03-25 PROCEDURE — 71046 X-RAY EXAM CHEST 2 VIEWS: CPT

## 2019-03-25 PROCEDURE — 88738 HGB QUANT TRANSCUTANEOUS: CPT

## 2019-03-25 PROCEDURE — 94727 GAS DIL/WSHOT DETER LNG VOL: CPT

## 2019-03-25 PROCEDURE — 95012 NITRIC OXIDE EXP GAS DETER: CPT

## 2019-03-25 PROCEDURE — 99213 OFFICE O/P EST LOW 20 MIN: CPT | Mod: 25

## 2019-03-25 PROCEDURE — 94060 EVALUATION OF WHEEZING: CPT

## 2019-03-25 PROCEDURE — 94729 DIFFUSING CAPACITY: CPT

## 2019-03-25 RX ORDER — BECLOMETHASONE DIPROPIONATE 80 UG/1
80 AEROSOL, METERED RESPIRATORY (INHALATION)
Refills: 0 | Status: DISCONTINUED | COMMUNITY
End: 2019-03-25

## 2019-03-25 RX ORDER — MOMETASONE FUROATE 100 UG/1
100 AEROSOL RESPIRATORY (INHALATION)
Qty: 3 | Refills: 1 | Status: DISCONTINUED | COMMUNITY
Start: 2018-10-04 | End: 2019-03-25

## 2019-03-25 RX ORDER — GABAPENTIN 300 MG/1
300 CAPSULE ORAL
Refills: 0 | Status: DISCONTINUED | COMMUNITY
End: 2019-03-25

## 2019-03-25 NOTE — HISTORY OF PRESENT ILLNESS
[Stable] : are stable [None] : The patient is currently asymptomatic [Difficulty Breathing During Exertion] : denies dyspnea on exertion [Feelings Of Weakness On Exertion] : denies exercise intolerance [Cough] : denies coughing [Wheezing] : denies wheezing [Regional Soft Tissue Swelling Both Lower Extremities] : denies lower extremity edema [FreeTextEntry1] : Denies cough or SOB\par Off gabapentin

## 2019-03-25 NOTE — ASSESSMENT
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 Recommend no change in medication

## 2019-03-27 ENCOUNTER — APPOINTMENT (OUTPATIENT)
Dept: PULMONOLOGY | Facility: CLINIC | Age: 79
End: 2019-03-27

## 2019-09-09 ENCOUNTER — APPOINTMENT (OUTPATIENT)
Dept: PULMONOLOGY | Facility: CLINIC | Age: 79
End: 2019-09-09
Payer: MEDICARE

## 2019-09-09 VITALS
HEIGHT: 61.5 IN | WEIGHT: 126 LBS | RESPIRATION RATE: 16 BRPM | BODY MASS INDEX: 23.48 KG/M2 | OXYGEN SATURATION: 100 % | SYSTOLIC BLOOD PRESSURE: 105 MMHG | HEART RATE: 58 BPM | DIASTOLIC BLOOD PRESSURE: 70 MMHG

## 2019-09-09 PROCEDURE — 94060 EVALUATION OF WHEEZING: CPT

## 2019-09-09 PROCEDURE — 99213 OFFICE O/P EST LOW 20 MIN: CPT | Mod: 25

## 2019-09-09 PROCEDURE — 36415 COLL VENOUS BLD VENIPUNCTURE: CPT

## 2019-09-09 PROCEDURE — 94729 DIFFUSING CAPACITY: CPT

## 2019-09-09 PROCEDURE — 94727 GAS DIL/WSHOT DETER LNG VOL: CPT

## 2019-09-10 LAB
BASOPHILS # BLD AUTO: 0.06 K/UL
BASOPHILS NFR BLD AUTO: 0.6 %
EOSINOPHIL # BLD AUTO: 0.38 K/UL
EOSINOPHIL NFR BLD AUTO: 3.7 %
HCT VFR BLD CALC: 44.6 %
HGB BLD-MCNC: 14 G/DL
IMM GRANULOCYTES NFR BLD AUTO: 0.5 %
LYMPHOCYTES # BLD AUTO: 3.34 K/UL
LYMPHOCYTES NFR BLD AUTO: 32.6 %
MAN DIFF?: NORMAL
MCHC RBC-ENTMCNC: 31.2 PG
MCHC RBC-ENTMCNC: 31.4 GM/DL
MCV RBC AUTO: 99.3 FL
MONOCYTES # BLD AUTO: 0.92 K/UL
MONOCYTES NFR BLD AUTO: 9 %
NEUTROPHILS # BLD AUTO: 5.49 K/UL
NEUTROPHILS NFR BLD AUTO: 53.6 %
PLATELET # BLD AUTO: 248 K/UL
RBC # BLD: 4.49 M/UL
RBC # FLD: 13.6 %
WBC # FLD AUTO: 10.24 K/UL

## 2019-09-10 NOTE — PROCEDURE
[FreeTextEntry1] : PFT: FEV1, FVC, and FEV1/FVC are within normal limits. There was not a significant response to inhaled bronchodilator. TLC is normal. FRC is normal. RV is decreased. RV/TLC ratio is decreased. Single breath diffusion capacity is reduced.

## 2019-09-10 NOTE — PHYSICAL EXAM
[General Appearance - Well Developed] : well developed [Normal Appearance] : normal appearance [Well Groomed] : well groomed [General Appearance - Well Nourished] : well nourished [No Deformities] : no deformities [General Appearance - In No Acute Distress] : no acute distress [Normal Conjunctiva] : the conjunctiva exhibited no abnormalities [Eyelids - No Xanthelasma] : the eyelids demonstrated no xanthelasmas [Normal Oropharynx] : normal oropharynx [Neck Appearance] : the appearance of the neck was normal [Neck Cervical Mass (___cm)] : no neck mass was observed [Jugular Venous Distention Increased] : there was no jugular-venous distention [Thyroid Diffuse Enlargement] : the thyroid was not enlarged [Thyroid Nodule] : there were no palpable thyroid nodules [Heart Rate And Rhythm] : heart rate and rhythm were normal [Heart Sounds] : normal S1 and S2 [Murmurs] : no murmurs present [Respiration, Rhythm And Depth] : normal respiratory rhythm and effort [Auscultation Breath Sounds / Voice Sounds] : lungs were clear to auscultation bilaterally [Exaggerated Use Of Accessory Muscles For Inspiration] : no accessory muscle use [Abdomen Soft] : soft [Abdomen Tenderness] : non-tender [Abdomen Mass (___ Cm)] : no abdominal mass palpated [Abnormal Walk] : normal gait [Gait - Sufficient For Exercise Testing] : the gait was sufficient for exercise testing [Nail Clubbing] : no clubbing of the fingernails [Cyanosis, Localized] : no localized cyanosis [Petechial Hemorrhages (___cm)] : no petechial hemorrhages [Skin Color & Pigmentation] : normal skin color and pigmentation [] : no rash [No Venous Stasis] : no venous stasis [Skin Lesions] : no skin lesions [No Skin Ulcers] : no skin ulcer [No Xanthoma] : no  xanthoma was observed [Sensation] : the sensory exam was normal to light touch and pinprick [Deep Tendon Reflexes (DTR)] : deep tendon reflexes were 2+ and symmetric [No Focal Deficits] : no focal deficits [Oriented To Time, Place, And Person] : oriented to person, place, and time [Impaired Insight] : insight and judgment were intact [Affect] : the affect was normal

## 2019-09-12 LAB — TOTAL IGE SMQN RAST: 233 KU/L

## 2020-07-27 ENCOUNTER — APPOINTMENT (OUTPATIENT)
Dept: PULMONOLOGY | Facility: CLINIC | Age: 80
End: 2020-07-27
Payer: MEDICARE

## 2020-07-27 VITALS
RESPIRATION RATE: 16 BRPM | HEART RATE: 70 BPM | SYSTOLIC BLOOD PRESSURE: 112 MMHG | DIASTOLIC BLOOD PRESSURE: 70 MMHG | TEMPERATURE: 98.2 F | OXYGEN SATURATION: 98 %

## 2020-07-27 PROCEDURE — 99214 OFFICE O/P EST MOD 30 MIN: CPT | Mod: 25

## 2020-07-27 PROCEDURE — 71046 X-RAY EXAM CHEST 2 VIEWS: CPT

## 2020-07-28 LAB — SARS-COV-2 N GENE NPH QL NAA+PROBE: NOT DETECTED

## 2020-07-31 ENCOUNTER — APPOINTMENT (OUTPATIENT)
Dept: PULMONOLOGY | Facility: CLINIC | Age: 80
End: 2020-07-31
Payer: MEDICARE

## 2020-07-31 VITALS
DIASTOLIC BLOOD PRESSURE: 66 MMHG | RESPIRATION RATE: 12 BRPM | BODY MASS INDEX: 24.23 KG/M2 | HEART RATE: 52 BPM | SYSTOLIC BLOOD PRESSURE: 104 MMHG | WEIGHT: 130 LBS | HEIGHT: 61.5 IN | OXYGEN SATURATION: 99 %

## 2020-07-31 LAB — POCT - HEMOGLOBIN (HGB), QUANTITATIVE, TRANSCUTANEOUS: 14.6

## 2020-07-31 PROCEDURE — 94729 DIFFUSING CAPACITY: CPT

## 2020-07-31 PROCEDURE — 88738 HGB QUANT TRANSCUTANEOUS: CPT

## 2020-07-31 PROCEDURE — 95012 NITRIC OXIDE EXP GAS DETER: CPT

## 2020-07-31 PROCEDURE — 94727 GAS DIL/WSHOT DETER LNG VOL: CPT

## 2020-07-31 PROCEDURE — 94060 EVALUATION OF WHEEZING: CPT

## 2020-07-31 NOTE — HISTORY OF PRESENT ILLNESS
[Never] : never [TextBox_4] : History of eosinophilic pneumonia and allergies\par No current respiratory complaints feels well

## 2021-04-25 ENCOUNTER — RX RENEWAL (OUTPATIENT)
Age: 81
End: 2021-04-25

## 2021-05-30 ENCOUNTER — TRANSCRIPTION ENCOUNTER (OUTPATIENT)
Age: 81
End: 2021-05-30

## 2021-06-01 ENCOUNTER — APPOINTMENT (OUTPATIENT)
Dept: PULMONOLOGY | Facility: CLINIC | Age: 81
End: 2021-06-01
Payer: MEDICARE

## 2021-06-01 VITALS
BODY MASS INDEX: 24.55 KG/M2 | RESPIRATION RATE: 18 BRPM | SYSTOLIC BLOOD PRESSURE: 118 MMHG | WEIGHT: 130 LBS | TEMPERATURE: 97.2 F | DIASTOLIC BLOOD PRESSURE: 75 MMHG | HEIGHT: 61 IN | HEART RATE: 68 BPM | OXYGEN SATURATION: 99 %

## 2021-06-01 LAB — POCT - HEMOGLOBIN (HGB), QUANTITATIVE, TRANSCUTANEOUS: 14.2

## 2021-06-01 PROCEDURE — 88738 HGB QUANT TRANSCUTANEOUS: CPT

## 2021-06-01 PROCEDURE — ZZZZZ: CPT

## 2021-06-01 PROCEDURE — 99214 OFFICE O/P EST MOD 30 MIN: CPT | Mod: 25

## 2021-06-01 PROCEDURE — 94727 GAS DIL/WSHOT DETER LNG VOL: CPT

## 2021-06-01 PROCEDURE — 94010 BREATHING CAPACITY TEST: CPT

## 2021-06-01 PROCEDURE — 95012 NITRIC OXIDE EXP GAS DETER: CPT

## 2021-06-01 PROCEDURE — 94729 DIFFUSING CAPACITY: CPT

## 2021-06-02 LAB
ALBUMIN SERPL ELPH-MCNC: 4.4 G/DL
ALP BLD-CCNC: 123 U/L
ALT SERPL-CCNC: 23 U/L
ANION GAP SERPL CALC-SCNC: 12 MMOL/L
AST SERPL-CCNC: 26 U/L
BASOPHILS # BLD AUTO: 0.04 K/UL
BASOPHILS NFR BLD AUTO: 0.5 %
BILIRUB SERPL-MCNC: 0.8 MG/DL
BUN SERPL-MCNC: 33 MG/DL
CALCIUM SERPL-MCNC: 9.6 MG/DL
CHLORIDE SERPL-SCNC: 105 MMOL/L
CO2 SERPL-SCNC: 25 MMOL/L
CREAT SERPL-MCNC: 1.08 MG/DL
CRP SERPL-MCNC: <3 MG/L
EOSINOPHIL # BLD AUTO: 0.25 K/UL
EOSINOPHIL NFR BLD AUTO: 3 %
ERYTHROCYTE [SEDIMENTATION RATE] IN BLOOD BY WESTERGREN METHOD: 9 MM/HR
GLUCOSE SERPL-MCNC: 82 MG/DL
HCT VFR BLD CALC: 45.2 %
HGB BLD-MCNC: 13.5 G/DL
IMM GRANULOCYTES NFR BLD AUTO: 0.2 %
LYMPHOCYTES # BLD AUTO: 2.97 K/UL
LYMPHOCYTES NFR BLD AUTO: 35.5 %
MAN DIFF?: NORMAL
MCHC RBC-ENTMCNC: 29.9 GM/DL
MCHC RBC-ENTMCNC: 30.3 PG
MCV RBC AUTO: 101.3 FL
MONOCYTES # BLD AUTO: 0.82 K/UL
MONOCYTES NFR BLD AUTO: 9.8 %
NEUTROPHILS # BLD AUTO: 4.26 K/UL
NEUTROPHILS NFR BLD AUTO: 51 %
PLATELET # BLD AUTO: 232 K/UL
POTASSIUM SERPL-SCNC: 4.7 MMOL/L
PROT SERPL-MCNC: 6.6 G/DL
RBC # BLD: 4.46 M/UL
RBC # FLD: 14 %
SODIUM SERPL-SCNC: 142 MMOL/L
WBC # FLD AUTO: 8.36 K/UL

## 2021-06-02 NOTE — REVIEW OF SYSTEMS
Please notify the patient of normal results  Of  Blood test for stool. send a mail copy.  Follow-up as planned. [Negative] : Endocrine

## 2021-06-02 NOTE — ASSESSMENT
[FreeTextEntry1] : Interval decline in lung function associate with increased in NIOX noted.  Suspect worsening of asthma.  Based on labs no evidence of eosinophilic pneumonia.\par Recommend increase in inhaled steroid dose to 2 sprays twice daily

## 2021-06-02 NOTE — HISTORY OF PRESENT ILLNESS
[Never] : never [TextBox_4] : History of eosinophilic pneumonia and allergies\par No current respiratory complaints feels well\par Taking limited amount of inhaled steroids\par Some increase in allergy symptoms

## 2021-06-03 LAB — IGE SER-MCNC: 170 KU/L

## 2021-08-27 ENCOUNTER — TRANSCRIPTION ENCOUNTER (OUTPATIENT)
Age: 81
End: 2021-08-27

## 2021-10-07 ENCOUNTER — APPOINTMENT (OUTPATIENT)
Dept: PULMONOLOGY | Facility: CLINIC | Age: 81
End: 2021-10-07
Payer: MEDICARE

## 2021-10-07 VITALS
TEMPERATURE: 98.7 F | OXYGEN SATURATION: 97 % | SYSTOLIC BLOOD PRESSURE: 104 MMHG | DIASTOLIC BLOOD PRESSURE: 53 MMHG | HEART RATE: 62 BPM

## 2021-10-07 PROCEDURE — 99213 OFFICE O/P EST LOW 20 MIN: CPT | Mod: CS,25

## 2021-10-07 PROCEDURE — 71046 X-RAY EXAM CHEST 2 VIEWS: CPT

## 2021-10-09 NOTE — HISTORY OF PRESENT ILLNESS
[TextBox_4] : Had Covid in [place] received Regeneron.  Feels better now was scheduled to have PFT today follow-up eosinophilic pneumonia.

## 2021-10-09 NOTE — ASSESSMENT
[FreeTextEntry1] : Recommend deferring PFT in light of recent Covid infection for at least 6 weeks and reschedule.  Continue current inhalers

## 2021-11-18 ENCOUNTER — APPOINTMENT (OUTPATIENT)
Dept: PULMONOLOGY | Facility: CLINIC | Age: 81
End: 2021-11-18
Payer: MEDICARE

## 2021-11-18 VITALS
OXYGEN SATURATION: 98 % | SYSTOLIC BLOOD PRESSURE: 120 MMHG | HEART RATE: 72 BPM | TEMPERATURE: 97.6 F | DIASTOLIC BLOOD PRESSURE: 76 MMHG

## 2021-11-18 PROCEDURE — 99213 OFFICE O/P EST LOW 20 MIN: CPT | Mod: CS,25

## 2021-11-18 PROCEDURE — 71046 X-RAY EXAM CHEST 2 VIEWS: CPT

## 2021-11-18 RX ORDER — METOPROLOL TARTRATE 50 MG/1
50 TABLET, FILM COATED ORAL
Refills: 0 | Status: COMPLETED | COMMUNITY
End: 2021-11-18

## 2021-11-18 NOTE — HISTORY OF PRESENT ILLNESS
[TextBox_4] : Patient here for f/u of asthma, eosinophilic pneumonia, recent Covid and abnormal chest x-ray\par \par Was not able to do breathing test.\par \par \par Overall feeling well

## 2021-11-18 NOTE — ASSESSMENT
[FreeTextEntry1] : Recovered from Covid radiographically follow-up in 3 months can defer PFT for now

## 2022-05-04 ENCOUNTER — NON-APPOINTMENT (OUTPATIENT)
Age: 82
End: 2022-05-04

## 2022-06-27 ENCOUNTER — APPOINTMENT (OUTPATIENT)
Dept: PULMONOLOGY | Facility: CLINIC | Age: 82
End: 2022-06-27
Payer: MEDICARE

## 2022-06-27 VITALS
SYSTOLIC BLOOD PRESSURE: 102 MMHG | OXYGEN SATURATION: 96 % | WEIGHT: 128 LBS | HEIGHT: 61 IN | BODY MASS INDEX: 24.17 KG/M2 | TEMPERATURE: 98 F | HEART RATE: 70 BPM | DIASTOLIC BLOOD PRESSURE: 53 MMHG

## 2022-06-27 LAB — POCT - HEMOGLOBIN (HGB), QUANTITATIVE, TRANSCUTANEOUS: 15.4

## 2022-06-27 PROCEDURE — 94729 DIFFUSING CAPACITY: CPT

## 2022-06-27 PROCEDURE — ZZZZZ: CPT

## 2022-06-27 PROCEDURE — 94727 GAS DIL/WSHOT DETER LNG VOL: CPT

## 2022-06-27 PROCEDURE — 99213 OFFICE O/P EST LOW 20 MIN: CPT | Mod: 25

## 2022-06-27 PROCEDURE — 88738 HGB QUANT TRANSCUTANEOUS: CPT

## 2022-06-27 PROCEDURE — 94010 BREATHING CAPACITY TEST: CPT

## 2022-06-27 NOTE — HISTORY OF PRESENT ILLNESS
[TextBox_4] : Patient here for f/u of asthma, eosinophilic pneumonia, \par \par Overall feeling well

## 2023-05-29 ENCOUNTER — RX RENEWAL (OUTPATIENT)
Age: 83
End: 2023-05-29

## 2023-07-03 ENCOUNTER — APPOINTMENT (OUTPATIENT)
Dept: PULMONOLOGY | Facility: CLINIC | Age: 83
End: 2023-07-03
Payer: MEDICARE

## 2023-07-03 VITALS — HEART RATE: 67 BPM | DIASTOLIC BLOOD PRESSURE: 67 MMHG | SYSTOLIC BLOOD PRESSURE: 105 MMHG | OXYGEN SATURATION: 94 %

## 2023-07-03 DIAGNOSIS — U07.1 COVID-19: ICD-10-CM

## 2023-07-03 DIAGNOSIS — D72.10 EOSINOPHILIA, UNSPECIFIED: ICD-10-CM

## 2023-07-03 LAB — POCT - HEMOGLOBIN (HGB), QUANTITATIVE, TRANSCUTANEOUS: 13.6

## 2023-07-03 PROCEDURE — 94010 BREATHING CAPACITY TEST: CPT

## 2023-07-03 PROCEDURE — 88738 HGB QUANT TRANSCUTANEOUS: CPT

## 2023-07-03 PROCEDURE — 95012 NITRIC OXIDE EXP GAS DETER: CPT

## 2023-07-03 PROCEDURE — ZZZZZ: CPT

## 2023-07-03 PROCEDURE — 99213 OFFICE O/P EST LOW 20 MIN: CPT | Mod: 25

## 2023-07-03 PROCEDURE — 94727 GAS DIL/WSHOT DETER LNG VOL: CPT

## 2023-07-03 PROCEDURE — 94729 DIFFUSING CAPACITY: CPT

## 2023-07-04 PROBLEM — U07.1 COVID-19: Status: ACTIVE | Noted: 2021-10-09

## 2023-07-04 NOTE — HISTORY OF PRESENT ILLNESS
[Never] : never [TextBox_4] : SHELLY DUBIN is a 82 year old female, with history of asthma and eosinophilic pneumonia, who presents to the office for follow up evaluation. Patient reports that she is feeling well overall with no respiratory complaints. She states that she continues to take Flovent HFA for asthma treatment. Patient reports of elidia a COVID-19 infection 2 months ago which has resolved without any lasting effects.

## 2023-07-04 NOTE — ASSESSMENT
[FreeTextEntry1] : Ms. SHELLY DUBIN is an 82 year old female with hx of eosinophilic pneumonia and asthma. Patient appears stable from a pulmonary perspective.

## 2023-07-04 NOTE — ADDENDUM
[FreeTextEntry1] : I, Dolores Limaashu, acted solely as a scribe for Dr. Micah Munson M.D. on this date 07/03/2023. \par \par All medical record entries made by the Scribe were at my, Dr. Micah Munson M.D., direction and personally dictated by me on 07/03/2023. I have reviewed the chart and agree that the record accurately reflects my personal performance of the history, physical exam, assessment and plan. I have also personally directed, reviewed, and agreed with the chart.

## 2024-02-01 NOTE — H&P ADULT - BP NONINVASIVE MEAN (MM HG)
81

## 2024-02-14 RX ORDER — FLUTICASONE PROPIONATE 110 UG/1
110 AEROSOL, METERED RESPIRATORY (INHALATION)
Qty: 36 | Refills: 3 | Status: DISCONTINUED | COMMUNITY
Start: 2022-05-05 | End: 2024-02-14

## 2024-02-16 ENCOUNTER — NON-APPOINTMENT (OUTPATIENT)
Age: 84
End: 2024-02-16

## 2024-02-17 RX ORDER — FLUTICASONE FUROATE 100 UG/1
100 POWDER RESPIRATORY (INHALATION) DAILY
Qty: 3 | Refills: 3 | Status: ACTIVE | COMMUNITY
Start: 2024-01-29 | End: 1900-01-01

## 2024-06-25 ENCOUNTER — APPOINTMENT (OUTPATIENT)
Dept: PULMONOLOGY | Facility: CLINIC | Age: 84
End: 2024-06-25
Payer: MEDICARE

## 2024-06-25 VITALS — HEART RATE: 56 BPM | OXYGEN SATURATION: 99 % | SYSTOLIC BLOOD PRESSURE: 115 MMHG | DIASTOLIC BLOOD PRESSURE: 69 MMHG

## 2024-06-25 DIAGNOSIS — J45.909 UNSPECIFIED ASTHMA, UNCOMPLICATED: ICD-10-CM

## 2024-06-25 DIAGNOSIS — Z87.09 PERSONAL HISTORY OF OTHER DISEASES OF THE RESPIRATORY SYSTEM: ICD-10-CM

## 2024-06-25 LAB — POCT - HEMOGLOBIN (HGB), QUANTITATIVE, TRANSCUTANEOUS: 14.1

## 2024-06-25 PROCEDURE — 99213 OFFICE O/P EST LOW 20 MIN: CPT | Mod: 25

## 2024-06-25 PROCEDURE — 94727 GAS DIL/WSHOT DETER LNG VOL: CPT

## 2024-06-25 PROCEDURE — 94010 BREATHING CAPACITY TEST: CPT

## 2024-06-25 PROCEDURE — ZZZZZ: CPT

## 2024-06-25 PROCEDURE — 88738 HGB QUANT TRANSCUTANEOUS: CPT

## 2024-06-25 PROCEDURE — 94729 DIFFUSING CAPACITY: CPT

## 2024-06-25 RX ORDER — EZETIMIBE 10 MG/1
10 TABLET ORAL
Refills: 0 | Status: ACTIVE | COMMUNITY

## 2024-06-25 RX ORDER — PREDNISOLONE ACETATE 10 MG/ML
1 SUSPENSION/ DROPS OPHTHALMIC
Qty: 10 | Refills: 0 | Status: COMPLETED | COMMUNITY
Start: 2018-09-25 | End: 2024-06-25

## 2025-02-20 ENCOUNTER — RX RENEWAL (OUTPATIENT)
Age: 85
End: 2025-02-20

## 2025-06-17 ENCOUNTER — APPOINTMENT (OUTPATIENT)
Dept: PULMONOLOGY | Facility: CLINIC | Age: 85
End: 2025-06-17
Payer: MEDICARE

## 2025-06-17 ENCOUNTER — APPOINTMENT (OUTPATIENT)
Dept: PULMONOLOGY | Facility: CLINIC | Age: 85
End: 2025-06-17

## 2025-06-17 VITALS
RESPIRATION RATE: 16 BRPM | HEART RATE: 56 BPM | SYSTOLIC BLOOD PRESSURE: 113 MMHG | DIASTOLIC BLOOD PRESSURE: 72 MMHG | WEIGHT: 123 LBS | OXYGEN SATURATION: 97 % | BODY MASS INDEX: 22.92 KG/M2 | HEIGHT: 61.5 IN

## 2025-06-17 PROBLEM — J31.0 RHINITIS: Status: ACTIVE | Noted: 2025-06-17

## 2025-06-17 LAB — POCT - HEMOGLOBIN (HGB), QUANTITATIVE, TRANSCUTANEOUS: 14.4

## 2025-06-17 PROCEDURE — 71046 X-RAY EXAM CHEST 2 VIEWS: CPT

## 2025-06-17 PROCEDURE — 94010 BREATHING CAPACITY TEST: CPT

## 2025-06-17 PROCEDURE — 88738 HGB QUANT TRANSCUTANEOUS: CPT

## 2025-06-17 PROCEDURE — 94727 GAS DIL/WSHOT DETER LNG VOL: CPT

## 2025-06-17 PROCEDURE — 99213 OFFICE O/P EST LOW 20 MIN: CPT | Mod: 25

## 2025-06-17 PROCEDURE — 95012 NITRIC OXIDE EXP GAS DETER: CPT

## 2025-06-17 PROCEDURE — 94729 DIFFUSING CAPACITY: CPT

## 2025-06-17 RX ORDER — IPRATROPIUM BROMIDE 21 UG/1
0.03 SPRAY, METERED NASAL
Qty: 3 | Refills: 3 | Status: ACTIVE | COMMUNITY
Start: 2025-06-17 | End: 1900-01-01

## 2025-09-16 ENCOUNTER — NON-APPOINTMENT (OUTPATIENT)
Age: 85
End: 2025-09-16

## 2025-09-18 ENCOUNTER — APPOINTMENT (OUTPATIENT)
Dept: PULMONOLOGY | Facility: CLINIC | Age: 85
End: 2025-09-18
Payer: MEDICARE

## 2025-09-18 VITALS
HEART RATE: 50 BPM | WEIGHT: 121 LBS | SYSTOLIC BLOOD PRESSURE: 119 MMHG | DIASTOLIC BLOOD PRESSURE: 67 MMHG | BODY MASS INDEX: 22.84 KG/M2 | OXYGEN SATURATION: 97 % | HEIGHT: 61 IN

## 2025-09-18 DIAGNOSIS — J45.909 UNSPECIFIED ASTHMA, UNCOMPLICATED: ICD-10-CM

## 2025-09-18 DIAGNOSIS — D72.10 EOSINOPHILIA, UNSPECIFIED: ICD-10-CM

## 2025-09-18 PROCEDURE — 94010 BREATHING CAPACITY TEST: CPT

## 2025-09-18 PROCEDURE — 99213 OFFICE O/P EST LOW 20 MIN: CPT | Mod: 25

## 2025-09-18 PROCEDURE — 95012 NITRIC OXIDE EXP GAS DETER: CPT

## 2025-09-18 RX ORDER — MONTELUKAST 10 MG/1
10 TABLET, FILM COATED ORAL
Qty: 1 | Refills: 3 | Status: ACTIVE | COMMUNITY
Start: 2025-09-18 | End: 1900-01-01